# Patient Record
Sex: FEMALE | Race: WHITE | Employment: FULL TIME | URBAN - METROPOLITAN AREA
[De-identification: names, ages, dates, MRNs, and addresses within clinical notes are randomized per-mention and may not be internally consistent; named-entity substitution may affect disease eponyms.]

---

## 2017-10-14 ENCOUNTER — CLINICAL SUPPORT (OUTPATIENT)
Dept: PRIMARY CARE CLINIC | Age: 62
End: 2017-10-14

## 2017-10-14 DIAGNOSIS — Z23 ENCOUNTER FOR IMMUNIZATION: Primary | ICD-10-CM

## 2017-10-14 NOTE — PROGRESS NOTES
Chief Complaint   Patient presents with    Immunization/Injection     needs flu shot     Omar De Oliveira  is a 60 y.o.  female  who present for flu immunizations/injections. She denies any symptoms , reactions or allergies that would exclude them from being immunized today. Injection given in left deltoid. Verbal order given for vaccine by Kacey Hill MD.   Risks and adverse reactions were discussed and the VIS was given if applicable to them. All questions were addressed. She was observed for 5 min post injection. There were no reactions observed.     Karole Gaucher, LPN

## 2017-10-14 NOTE — MR AVS SNAPSHOT
Visit Information Date & Time Provider Department Dept. Phone Encounter #  
 10/14/2017  8:00  [address], [address] [address] [phone] [id number] Upcoming Health Maintenance Date Due Hepatitis C Screening 1955 DTaP/Tdap/Td series (1 - Tdap) 2/26/1975 PAP AKA CERVICAL CYTOLOGY 2/26/1975 FOBT Q 1 YEAR AGE 50-75 2/26/2004 ZOSTER VACCINE AGE 60> 12/26/2013 BREAST CANCER SCRN MAMMOGRAM 7/22/2015 INFLUENZA AGE 9 TO ADULT 8/1/2017 Allergies as of 10/14/2017  Review Complete On: 10/14/2017 By: Radha Gusman LPN Severity Noted Reaction Type Reactions Augmentin [Amoxicillin-pot Clavulanate]  10/10/2015    Other (comments) Joint pain Current Immunizations  Reviewed on 10/14/2017 Name Date Influenza Vaccine (Quad) PF 10/14/2017  8:47 AM, 10/22/2016 Reviewed by Radha Gusman LPN on 55/21/0193 at  8:57 AM  
You Were Diagnosed With   
  
 Codes Comments Encounter for immunization    -  Primary ICD-10-CM: Y91 ICD-9-CM: V03.89 Vitals LMP OB Status Smoking Status 06/19/2009 Postmenopausal Never Smoker Preferred Pharmacy Pharmacy Name Phone [facility] [address], [address] [place] [address] Avenue [phone] Your Updated Medication List  
  
   
This list is accurate as of: 10/14/17  8:58 AM.  Always use your most recent med list.  
  
  
  
  
 albuterol 90 mcg/actuation inhaler Commonly known as:  PROVENTIL HFA, VENTOLIN HFA, PROAIR HFA Take 1 Puff by inhalation every six (6) hours as needed for Wheezing or Shortness of Breath (cough). ALLEGRA 180 mg tablet Generic drug:  fexofenadine Take  by mouth daily. ALLEGRA-D 24 HOUR 180-240 mg per tablet Generic drug:  fexofenadine-pseudoephedrine Take 1 Tab by mouth daily. ALPRAZolam 0.5 mg tablet Commonly known as:  Carletha Puller Take 0.5 mg by mouth nightly as needed. May take twice a day if needed ALVESCO 160 mcg/actuation Hfaa Generic drug:  Ciclesonide Take  by inhalation. buPROPion  mg SR tablet Commonly known as:  WELLBUTRIN SR  
TAKE ONE TABLET BY MOUTH TWICE DAILY CALCIUM + D PO Take 2 Tabs by mouth daily. citalopram 40 mg tablet Commonly known as:  CELEXA  
TAKE ONE TABLET BY MOUTH EVERY DAY  
  
 diclofenac EC 75 mg EC tablet Commonly known as:  VOLTAREN Take 1 Tab by mouth two (2) times a day. docusate sodium 50 mg capsule Commonly known as:  Vivian Lowers Take 100 mg by mouth daily. famotidine 20 mg tablet Commonly known as:  PEPCID Take 20 mg by mouth daily. guaiFENesin-codeine 100-10 mg/5 mL solution Commonly known as:  ROBITUSSIN AC Take 5 mL by mouth three (3) times daily as needed for Cough or Congestion. Max Daily Amount: 15 mL.  
  
 hydroCHLOROthiazide 25 mg tablet Commonly known as:  HYDRODIURIL Take 1 Tab by mouth daily. montelukast 10 mg tablet Commonly known as:  SINGULAIR Take 1 Tab by mouth daily. NASONEX 50 mcg/actuation nasal spray Generic drug:  mometasone 2 Sprays daily. simvastatin 40 mg tablet Commonly known as:  ZOCOR  
TAKE ONE TABLET BY MOUTH EVERY DAY AT BEDTIME  
  
 SYMBICORT 160-4.5 mcg/actuation Hfaa Generic drug:  budesonide-formoterol Take 2 Puffs by inhalation two (2) times a day. VERAMYST 27.5 mcg/actuation nasal spray Generic drug:  fluticasone 2 Sprays by Nasal route daily. We Performed the Following INFLUENZA VIRUS VAC QUAD,SPLIT,PRESV FREE SYRINGE IM [id number] CPT(R)] [facility]! Dear Ling Sapp: Thank you for requesting a [employer] account. Our records indicate that you already have an active [employer] account. You can access your account anytime at [email or web address] Did you know that you can access your hospital and ER discharge instructions at any time in [employer]?   You can also review all of your test results from your hospital stay or ER visit. Additional Information If you have questions, please visit the Frequently Asked Questions section of the [employer] website at [email or web address] Remember, [employer] is NOT to be used for urgent needs. For medical emergencies, dial 911. Now available from your iPhone and Android! Please provide this summary of care documentation to your next provider. Your primary care clinician is listed as [facility]. If you have any questions after today's visit, please call [phone].

## 2017-11-10 ENCOUNTER — OFFICE VISIT (OUTPATIENT)
Dept: PRIMARY CARE CLINIC | Age: 62
End: 2017-11-10

## 2017-11-10 VITALS
TEMPERATURE: 97.4 F | SYSTOLIC BLOOD PRESSURE: 128 MMHG | BODY MASS INDEX: 24.75 KG/M2 | OXYGEN SATURATION: 97 % | WEIGHT: 154 LBS | RESPIRATION RATE: 18 BRPM | DIASTOLIC BLOOD PRESSURE: 85 MMHG | HEIGHT: 66 IN | HEART RATE: 97 BPM

## 2017-11-10 DIAGNOSIS — J45.21 MILD INTERMITTENT ASTHMATIC BRONCHITIS WITH ACUTE EXACERBATION: ICD-10-CM

## 2017-11-10 DIAGNOSIS — S05.01XA ABRASION OF RIGHT CORNEA, INITIAL ENCOUNTER: Primary | ICD-10-CM

## 2017-11-10 DIAGNOSIS — R06.02 SHORTNESS OF BREATH: ICD-10-CM

## 2017-11-10 RX ORDER — GENTAMICIN SULFATE 0.3 %
0.5 OINTMENT (GRAM) OPHTHALMIC (EYE) 3 TIMES DAILY
Qty: 3.5 G | Refills: 0 | Status: SHIPPED | OUTPATIENT
Start: 2017-11-10 | End: 2017-11-10 | Stop reason: ALTCHOICE

## 2017-11-10 RX ORDER — BUPROPION HYDROCHLORIDE 300 MG/1
300 TABLET ORAL DAILY
COMMUNITY
Start: 2017-11-09

## 2017-11-10 RX ORDER — ERYTHROMYCIN 5 MG/G
OINTMENT OPHTHALMIC
Qty: 3.5 G | Refills: 0 | Status: SHIPPED | OUTPATIENT
Start: 2017-11-10 | End: 2018-10-20 | Stop reason: ALTCHOICE

## 2017-11-10 NOTE — PATIENT INSTRUCTIONS
Asthma Attack: Care Instructions  Your Care Instructions    During an asthma attack, the airways swell and narrow. This makes it hard to breathe. Severe asthma attacks can be life-threatening, but you can help prevent them by keeping your asthma under control and treating symptoms before they get bad. Symptoms include being short of breath, having chest tightness, coughing, and wheezing. Noting and treating these symptoms can also help you avoid future trips to the emergency room. The doctor has checked you carefully, but problems can develop later. If you notice any problems or new symptoms, get medical treatment right away. Follow-up care is a key part of your treatment and safety. Be sure to make and go to all appointments, and call your doctor if you are having problems. It's also a good idea to know your test results and keep a list of the medicines you take. How can you care for yourself at home? · Follow your asthma action plan to prevent and treat attacks. If you don't have an asthma action plan, work with your doctor to create one. · Take your asthma medicines exactly as prescribed. Talk to your doctor right away if you have any questions about how to take them. ¨ Use your quick-relief medicine when you have symptoms of an attack. Quick-relief medicine is usually an albuterol inhaler. Some people need to use quick-relief medicine before they exercise. ¨ Take your controller medicine every day, not just when you have symptoms. Controller medicine is usually an inhaled corticosteroid. The goal is to prevent problems before they occur. Don't use your controller medicine to treat an attack that has already started. It doesn't work fast enough to help. ¨ If your doctor prescribed corticosteroid pills to use during an attack, take them exactly as prescribed. It may take hours for the pills to work, but they may make the episode shorter and help you breathe better.   ¨ Keep your quick-relief medicine with you at all times. · Talk to your doctor before using other medicines. Some medicines, such as aspirin, can cause asthma attacks in some people. · If you have a peak flow meter, use it to check how well you are breathing. This can help you predict when an asthma attack is going to occur. Then you can take medicine to prevent the asthma attack or make it less severe. · Do not smoke or allow others to smoke around you. Avoid smoky places. Smoking makes asthma worse. If you need help quitting, talk to your doctor about stop-smoking programs and medicines. These can increase your chances of quitting for good. · Learn what triggers an asthma attack for you, and avoid the triggers when you can. Common triggers include colds, smoke, air pollution, dust, pollen, mold, pets, cockroaches, stress, and cold air. · Avoid colds and the flu. Get a pneumococcal vaccine shot. If you have had one before, ask your doctor if you need a second dose. Get a flu vaccine every fall. If you must be around people with colds or the flu, wash your hands often. When should you call for help? Call 911 anytime you think you may need emergency care. For example, call if:  ? · You have severe trouble breathing. ?Call your doctor now or seek immediate medical care if:  ? · Your symptoms do not get better after you have followed your asthma action plan. ? · You have new or worse trouble breathing. ? · Your coughing and wheezing get worse. ? · You cough up dark brown or bloody mucus (sputum). ? · You have a new or higher fever. ? Watch closely for changes in your health, and be sure to contact your doctor if:  ? · You need to use quick-relief medicine on more than 2 days a week (unless it is just for exercise). ? · You cough more deeply or more often, especially if you notice more mucus or a change in the color of your mucus. ? · You are not getting better as expected. Where can you learn more?   Go to http://juels-stephanie.info/. Enter U974 in the search box to learn more about \"Asthma Attack: Care Instructions. \"  Current as of: May 12, 2017  Content Version: 11.4  © 7189-7273 [employer]. Care instructions adapted under license by [employer] (which disclaims liability or warranty for this information). If you have questions about a medical condition or this instruction, always ask your healthcare professional. [place] [address] any warranty or liability for your use of this information. Corneal Scratches: Care Instructions  Your Care Instructions    The cornea is the clear surface that covers the front of the eye. When a speck of dirt, a wood chip, an insect, or another object flies into your eye, it can cause a painful scratch on the cornea. Wearing contact lenses too long or rubbing your eyes can also scratch the cornea. Small scratches usually heal in a day or two. Deeper scratches may take longer. If you have had a foreign object removed from your eye or you have a corneal scratch, you will need to watch for infection and vision problems while your eye heals. Follow-up care is a key part of your treatment and safety. Be sure to make and go to all appointments, and call your doctor if you are having problems. It's also a good idea to know your test results and keep a list of the medicines you take. How can you care for yourself at home? · The doctor probably used a medicine during your exam to numb your eye. When it wears off in 30 to 60 minutes, your eye pain may come back. Take pain medicines exactly as directed. ¨ If the doctor gave you a prescription medicine for pain, take it as prescribed. ¨ If you are not taking a prescription pain medicine, ask your doctor if you can take an over-the-counter medicine. ¨ Do not take two or more pain medicines at the same time unless the doctor told you to.  Many pain medicines have acetaminophen, which is Tylenol. Too much acetaminophen (Tylenol) can be harmful. · Do not rub your injured eye. Rubbing can make it worse. · Use the prescribed eyedrops or ointment as directed. Be sure the dropper or bottle tip is clean. To put in eyedrops or ointment:  ¨ Tilt your head back, and pull your lower eyelid down with one finger. ¨ Drop or squirt the medicine inside the lower lid. ¨ Close your eye for 30 to 60 seconds to let the drops or ointment move around. ¨ Do not touch the ointment or dropper tip to your eyelashes or any other surface. · Do not use your contact lens in your hurt eye until your doctor says you can. Also, do not wear eye makeup until your eye has healed. · Do not drive if you have blurred vision. · Bright light may hurt. Sunglasses can help. · To prevent eye injuries in the future, wear safety glasses or goggles when you work with machines or tools, mow the lawn, or ride a bike or motorcycle. When should you call for help? Call your doctor now or seek immediate medical care if:  ? · You have signs of an eye infection, such as:  ¨ Pus or thick discharge coming from the eye. ¨ Redness or swelling around the eye. ¨ A fever. ? · You have new or worse eye pain. ? · You have vision changes. ? · It feels like there is something in your eye. ? · Light hurts your eye. ? Watch closely for changes in your health, and be sure to contact your doctor if:  ? · You do not get better as expected. Where can you learn more? Go to http://jules-stephanie.info/. Enter J420 in the search box to learn more about \"Corneal Scratches: Care Instructions. \"  Current as of: March 3, 2017  Content Version: 11.4  © 8477-6216 [employer]. Care instructions adapted under license by [employer] (which disclaims liability or warranty for this information).  If you have questions about a medical condition or this instruction, always ask your healthcare professional. [employer] disclaims any warranty or liability for your use of this information.

## 2017-11-10 NOTE — PROGRESS NOTES
Chief Complaint   Patient presents with    Asthma     for a couple weeks having trouble breathing, has used inhaler

## 2017-11-10 NOTE — PROGRESS NOTES
Shilo Vance is a 60 y.o. female who presents for coughing and chest tightness x2 weeks. She has hx of asthma diagnosed 5 yrs ago. She does not use symbicort, but has been using albuterol prn, last used this AM. States she feels tight to breathe when she goes up the stairs. She takes allegra D. No fever or chills. No recent asthma flare in the past year. She also complains of right eye pain. States she scratched her eye 2 days ago with a mascara wand. Eye is painful, she denies any thick drainage but eyes are watery. She has tried artifical tears. She has avoided wearing her contact lenses since. 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 60 Tab 0    albuterol (PROVENTIL HFA, VENTOLIN HFA, PROAIR HFA) 90 mcg/actuation inhaler Take 1 Puff by inhalation every six (6) hours as needed for Wheezing or Shortness of Breath (cough). 1 Inhaler 0    montelukast (SINGULAIR) 10 mg tablet Take 1 Tab by mouth daily. 30 Tab 0    fexofenadine-pseudoephedrine (ALLEGRA-D 24 HOUR) 180-240 mg per tablet Take 1 Tab by mouth daily.  famotidine (PEPCID) 20 mg tablet Take 20 mg by mouth daily.  Ciclesonide (ALVESCO) 160 mcg/actuation HFAA Take  by inhalation.       mometasone (NASONEX) 50 mcg/actuation nasal spray 2 Sprays daily.      fluticasone (VERAMYST) 27.5 mcg/actuation nasal spray 2 Sprays by Nasal route daily.  hydrochlorothiazide (HYDRODIURIL) 25 mg tablet Take 1 Tab by mouth daily. 30 Tab 6    citalopram (CELEXA) 40 mg tablet TAKE ONE TABLET BY MOUTH EVERY DAY 90 Tab 2    simvastatin (ZOCOR) 40 mg tablet TAKE ONE TABLET BY MOUTH EVERY DAY AT BEDTIME 90 Tab 2    alprazolam (XANAX) 0.5 mg tablet Take 0.5 mg by mouth nightly as needed. May take twice a day if needed      docusate sodium (COLACE) 50 mg capsule Take 100 mg by mouth daily.  CALCIUM CARBONATE/VITAMIN D3 (CALCIUM + D PO) Take 2 Tabs by mouth daily. Allergies:    Allergies   Allergen Reactions    Augmentin [Amoxicillin-Pot Clavulanate] Other (comments)     Joint pain       Smoker:  History   Smoking Status    Never Smoker   Smokeless Tobacco    Never Used       ETOH:   History   Alcohol Use    Yes     Comment: social       FH:   Family History   Problem Relation Age of Onset    Stroke Mother     Hypertension Mother     Asthma Maternal Aunt     Cancer Maternal Uncle     Diabetes Paternal Uncle     Heart Disease Maternal Grandfather        ROS:  General/Constitutional:   No headache, fever, fatigue, weight loss or weight gain       Eyes:   No redness, pruritis, pain, visual changes, swelling, or discharge      Ears:    No pain, loss or changes in hearing     Nose: Nasal congestion and rhinorrea  Neck:   No swelling, masses, stiffness, pain, or limited movement     Cardiac:    No chest pain      Respiratory:  cough   GI:   No nausea/vomiting, diarrhea, abdominal pain, bloody or dark stools    : no dysuria      Skin: No rash   Neuro: no numbness or weakness  Psych: no depression    Physical Exam:  Visit Vitals    /85 (BP 1 Location: Right arm, BP Patient Position: Sitting)    Pulse 97    Temp 97.4 °F (36.3 °C) (Oral)    Resp 18    Ht 5' 6\" (1.676 m)    Wt 154 lb (69.9 kg)    LMP 06/19/2009    SpO2 97%    BMI 24.86 kg/m2 General: Alert and oriented, in no acute distress. Responds to all questions appropriately. SKIN: No rash. Normal color. HEAD: No sinus tenderness. EYES: right eye mildly erythematous, clear drainage. Left eye normal.   EARS: External normal, canals clear, tympanic membranes normal.  NOSE: Edema, erythema, clear mucous drainage. OROPHARYNX: Slight tonsil edema, erythema, no exudate. NECK: Supple; no masses; normal lymphadenopathy. LUNGS: Respirations unlabored; clear to auscultation bilaterally, no wheeze, rales or rhonchi. CARDIOVASCULAR: Regular, rate, and rhythm without murmurs, gallops or rubs. ABDOMEN: soft, NT  EXTREMITIES: No edema, cyanosis or clubbing. NEUROLOGIC: Speech intact; face symmetrical; moves all extremities equally  PSYCH: normal mood and behavior      Assessment:    ICD-10-CM ICD-9-CM    1. Abrasion of right cornea, initial encounter S05. 01XA 918.1 erythromycin (ILOTYCIN) ophthalmic ointment      DISCONTINUED: gentamicin (GARAMYCIN) 0.3 % (3 mg/gram) ophthalmic ointment   2. Shortness of breath R06.02 786.05 buPROPion XL (WELLBUTRIN XL) 300 mg XL tablet      XR CHEST PA LAT   3. Mild intermittent asthmatic bronchitis with acute exacerbation J45.21 493.92 beclomethasone (QVAR) 40 mcg/actuation aero     Right eye examined under UV light after applying benzocaine drops and fluorescein dye, revealed 2-3 mm vertical corneal defect. Start erythromycin oint, tylenol prn for pain, avoid contacts until pain free. ED warnings discussed. SOB/asthma exacerbation, CXR clear, no hypoxia. No wheezing now. Patient worried about chest tightness. Declined course of prednisone. She does not use controller inhaler and rx has . Try short course of inhaled qvar, return if no improvement or worsening. Continue albuterol prn. Plan:  Discharge instructions:  1. Combination cough and cold medicine such as Mucinex DM  2. Salt water gargle. 3. Plenty of fluids.   4. Ibuprofen (Motrin, Advil):  200mg - take 1-4 tables three times as needed for pain and fever   5. Acetaminophen (Tylenol):  500mg 1-2 tablets every 6 hours as needed for pain and fever. 6. Throat lozenges such as Halls as needed. 7. Humidifier as needed. Follow Up:  Get re-examined if not improved in  5-7 days or if symptoms worsen. If you get suddenly worse, go to the nearest hospital Emergency Room    This note will not be viewable in MyChart.

## 2017-11-10 NOTE — MR AVS SNAPSHOT
Visit Information Date & Time Provider Department Dept. Phone Encounter #  
 11/10/2017  4:30 PM Dean Cindi MachadoJaylan [id number] Upcoming Health Maintenance Date Due Hepatitis C Screening 1955 DTaP/Tdap/Td series (1 - Tdap) 2/26/1975 PAP AKA CERVICAL CYTOLOGY 2/26/1975 FOBT Q 1 YEAR AGE 50-75 2/26/2004 ZOSTER VACCINE AGE 60> 12/26/2013 BREAST CANCER SCRN MAMMOGRAM 7/22/2015 Allergies as of 11/10/2017  Review Complete On: 11/10/2017 By: Dean Machado MD  
  
 Severity Noted Reaction Type Reactions Augmentin [Amoxicillin-pot Clavulanate]  10/10/2015    Other (comments) Joint pain Current Immunizations  Reviewed on 10/14/2017 Name Date Influenza Vaccine (Quad) PF 10/14/2017  8:47 AM, 10/22/2016 Not reviewed this visit You Were Diagnosed With   
  
 Codes Comments Abrasion of right cornea, initial encounter    -  Primary ICD-10-CM: S05. [place] Self ICD-9-CM: 918.1 Shortness of breath     ICD-10-CM: R06.02 
ICD-9-CM: 786.05   
 Mild intermittent asthmatic bronchitis with acute exacerbation     ICD-10-CM: J45.21 ICD-9-CM: 422.82 Vitals BP Pulse Temp Resp Height(growth percentile) Weight(growth percentile) 128/85 (BP 1 Location: Right arm, BP Patient Position: Sitting) 97 97.4 °F (36.3 °C) (Oral) 18 5' 6\" (1.676 m) 154 lb (69.9 kg) LMP SpO2 BMI OB Status Smoking Status 06/19/2009 97% 24.86 kg/m2 Postmenopausal Never Smoker BMI and BSA Data Body Mass Index Body Surface Area  
 24.86 kg/m 2 1.8 m 2 Preferred Pharmacy Pharmacy Name [facility], [address] [phone] Your Updated Medication List  
  
   
This list is accurate as of: 11/10/17  5:27 PM.  Always use your most recent med list.  
  
  
  
  
 albuterol 90 mcg/actuation inhaler Commonly known as:  PROVENTIL HFA, VENTOLIN HFA, PROAIR HFA  
 Take 1 Puff by inhalation every six (6) hours as needed for Wheezing or Shortness of Breath (cough). ALLEGRA-D 24 HOUR 180-240 mg per tablet Generic drug:  fexofenadine-pseudoephedrine Take 1 Tab by mouth daily. ALPRAZolam 0.5 mg tablet Commonly known as:  Kimberly Cameron Take 0.5 mg by mouth nightly as needed. May take twice a day if needed ALVESCO 160 mcg/actuation Hfaa Generic drug:  Ciclesonide Take  by inhalation. beclomethasone 40 mcg/actuation MediaSilo Commonly known as:  QVAR Take 1-2 Puffs by inhalation two (2) times a day. buPROPion  mg XL tablet Commonly known as:  WELLBUTRIN XL  
  
 CALCIUM + D PO Take 2 Tabs by mouth daily. citalopram 40 mg tablet Commonly known as:  CELEXA  
TAKE ONE TABLET BY MOUTH EVERY DAY  
  
 diclofenac EC 75 mg EC tablet Commonly known as:  VOLTAREN Take 1 Tab by mouth two (2) times a day. docusate sodium 50 mg capsule Commonly known as:  Jb Booze Take 100 mg by mouth daily. famotidine 20 mg tablet Commonly known as:  PEPCID Take 20 mg by mouth daily. gentamicin 0.3 % (3 mg/gram) ophthalmic ointment Commonly known as:  GARAMYCIN Administer 0.5 Inches to right eye three (3) times daily for 5 days. hydroCHLOROthiazide 25 mg tablet Commonly known as:  HYDRODIURIL Take 1 Tab by mouth daily. montelukast 10 mg tablet Commonly known as:  SINGULAIR Take 1 Tab by mouth daily. NASONEX 50 mcg/actuation nasal spray Generic drug:  mometasone 2 Sprays daily. simvastatin 40 mg tablet Commonly known as:  ZOCOR  
TAKE ONE TABLET BY MOUTH EVERY DAY AT BEDTIME  
  
 VERAMYST 27.5 mcg/actuation nasal spray Generic drug:  fluticasone 2 Sprays by Nasal route daily. Prescriptions Sent to Pharmacy Refills  
 gentamicin (GARAMYCIN) 0.3 % (3 mg/gram) ophthalmic ointment 0 Sig: Administer 0.5 Inches to right eye three (3) times daily for 5 days. Class: Normal  
 [facility] #: [phone] Route: Right Eye  
 beclomethasone (QVAR) 40 mcg/actuation aero 0 Sig: Take 1-2 Puffs by inhalation two (2) times a day. Class: Normal  
 Pharmacy: [address] [place], [place] [address]: [phone] Route: Inhalation To-Do List   
 11/10/2017 Imaging:  XR CHEST PA LAT Patient Instructions Asthma Attack: Care Instructions Your Care Instructions During an asthma attack, the airways swell and narrow. This makes it hard to breathe. Severe asthma attacks can be life-threatening, but you can help prevent them by keeping your asthma under control and treating symptoms before they get bad. Symptoms include being short of breath, having chest tightness, coughing, and wheezing. Noting and treating these symptoms can also help you avoid future trips to the emergency room. The doctor has checked you carefully, but problems can develop later. If you notice any problems or new symptoms, get medical treatment right away. Follow-up care is a key part of your treatment and safety. Be sure to make and go to all appointments, and call your doctor if you are having problems. It's also a good idea to know your test results and keep a list of the medicines you take. How can you care for yourself at home? · Follow your asthma action plan to prevent and treat attacks. If you don't have an asthma action plan, work with your doctor to create one. · Take your asthma medicines exactly as prescribed. Talk to your doctor right away if you have any questions about how to take them. ¨ Use your quick-relief medicine when you have symptoms of an attack. Quick-relief medicine is usually an albuterol inhaler. Some people need to use quick-relief medicine before they exercise.  
¨ Take your controller medicine every day, not just when you have symptoms. Controller medicine is usually an inhaled corticosteroid. The goal is to prevent problems before they occur. Don't use your controller medicine to treat an attack that has already started. It doesn't work fast enough to help. ¨ If your doctor prescribed corticosteroid pills to use during an attack, take them exactly as prescribed. It may take hours for the pills to work, but they may make the episode shorter and help you breathe better. ¨ Keep your quick-relief medicine with you at all times. · Talk to your doctor before using other medicines. Some medicines, such as aspirin, can cause asthma attacks in some people. · If you have a peak flow meter, use it to check how well you are breathing. This can help you predict when an asthma attack is going to occur. Then you can take medicine to prevent the asthma attack or make it less severe. · Do not smoke or allow others to smoke around you. Avoid smoky places. Smoking makes asthma worse. If you need help quitting, talk to your doctor about stop-smoking programs and medicines. These can increase your chances of quitting for good. · Learn what triggers an asthma attack for you, and avoid the triggers when you can. Common triggers include colds, smoke, air pollution, dust, pollen, mold, pets, cockroaches, stress, and cold air. · Avoid colds and the flu. Get a pneumococcal vaccine shot. If you have had one before, ask your doctor if you need a second dose. Get a flu vaccine every fall. If you must be around people with colds or the flu, wash your hands often. When should you call for help? Call 911 anytime you think you may need emergency care. For example, call if: 
? · You have severe trouble breathing. ?Call your doctor now or seek immediate medical care if: 
? · Your symptoms do not get better after you have followed your asthma action plan. ? · You have new or worse trouble breathing. ? · Your coughing and wheezing get worse. ? · You cough up dark brown or bloody mucus (sputum). ? · You have a new or higher fever. ? Watch closely for changes in your health, and be sure to contact your doctor if: 
? · You need to use quick-relief medicine on more than 2 days a week (unless it is just for exercise). ? · You cough more deeply or more often, especially if you notice more mucus or a change in the color of your mucus. ? · You are not getting better as expected. Where can you learn more? Go to http://jules-stephanie.info/. Enter R705 in the search box to learn more about \"Asthma Attack: Care Instructions. \" Current as of: May 12, 2017 Content Version: 11.4 © 7002-3010 Sonos. Care instructions adapted under license by [employer] (which disclaims liability or warranty for this information). If you have questions about a medical condition or this instruction, always ask your healthcare professional. [place] [address] any warranty or liability for your use of this information. Corneal Scratches: Care Instructions Your Care Instructions The cornea is the clear surface that covers the front of the eye. When a speck of dirt, a wood chip, an insect, or another object flies into your eye, it can cause a painful scratch on the cornea. Wearing contact lenses too long or rubbing your eyes can also scratch the cornea. Small scratches usually heal in a day or two. Deeper scratches may take longer. If you have had a foreign object removed from your eye or you have a corneal scratch, you will need to watch for infection and vision problems while your eye heals. Follow-up care is a key part of your treatment and safety. Be sure to make and go to all appointments, and call your doctor if you are having problems. It's also a good idea to know your test results and keep a list of the medicines you take. How can you care for yourself at home? · The doctor probably used a medicine during your exam to numb your eye. When it wears off in 30 to 60 minutes, your eye pain may come back. Take pain medicines exactly as directed. ¨ If the doctor gave you a prescription medicine for pain, take it as prescribed. ¨ If you are not taking a prescription pain medicine, ask your doctor if you can take an over-the-counter medicine. ¨ Do not take two or more pain medicines at the same time unless the doctor told you to. Many pain medicines have acetaminophen, which is Tylenol. Too much acetaminophen (Tylenol) can be harmful. · Do not rub your injured eye. Rubbing can make it worse. · Use the prescribed eyedrops or ointment as directed. Be sure the dropper or bottle tip is clean. To put in eyedrops or ointment: ¨ Tilt your head back, and pull your lower eyelid down with one finger. ¨ Drop or squirt the medicine inside the lower lid. ¨ Close your eye for 30 to 60 seconds to let the drops or ointment move around. ¨ Do not touch the ointment or dropper tip to your eyelashes or any other surface. · Do not use your contact lens in your hurt eye until your doctor says you can. Also, do not wear eye makeup until your eye has healed. · Do not drive if you have blurred vision. · Bright light may hurt. Sunglasses can help. · To prevent eye injuries in the future, wear safety glasses or goggles when you work with machines or tools, mow the lawn, or ride a bike or motorcycle. When should you call for help? Call your doctor now or seek immediate medical care if: 
? · You have signs of an eye infection, such as: 
¨ Pus or thick discharge coming from the eye. ¨ Redness or swelling around the eye. ¨ A fever. ? · You have new or worse eye pain. ? · You have vision changes. ? · It feels like there is something in your eye. ? · Light hurts your eye. ? Watch closely for changes in your health, and be sure to contact your doctor if: ? · You do not get better as expected. Where can you learn more? Go to http://jules-stephanie.info/. Enter U347 in the search box to learn more about \"Corneal Scratches: Care Instructions. \" Current as of: March 3, 2017 Content Version: 11.4 © 0613-2778 Apse. Care instructions adapted under license by [employer] (which disclaims liability or warranty for this information). If you have questions about a medical condition or this instruction, always ask your healthcare professional. [address] any warranty or liability for your use of this information. Introducing hospitals & HEALTH SERVICES! Dear Nancy Bhatt: Thank you for requesting a Ecwid account. Our records indicate that you already have an active Ecwid account. You can access your account anytime at [email or web address] Did you know that you can access your hospital and ER discharge instructions at any time in Ecwid? You can also review all of your test results from your hospital stay or ER visit. Additional Information If you have questions, please visit the Frequently Asked Questions section of the Ecwid website at [email or web address] Remember, Ecwid is NOT to be used for urgent needs. For medical emergencies, dial 911. Now available from your iPhone and Android! Please provide this summary of care documentation to your next provider. Your primary care clinician is listed as [facility]. If you have any questions after today's visit, please call [phone].

## 2018-10-20 ENCOUNTER — OFFICE VISIT (OUTPATIENT)
Dept: PRIMARY CARE CLINIC | Age: 63
End: 2018-10-20

## 2018-10-20 VITALS
OXYGEN SATURATION: 98 % | RESPIRATION RATE: 17 BRPM | HEIGHT: 66 IN | HEART RATE: 71 BPM | SYSTOLIC BLOOD PRESSURE: 122 MMHG | DIASTOLIC BLOOD PRESSURE: 74 MMHG | WEIGHT: 153.2 LBS | TEMPERATURE: 98.7 F | BODY MASS INDEX: 24.62 KG/M2

## 2018-10-20 DIAGNOSIS — J40 BRONCHITIS: ICD-10-CM

## 2018-10-20 DIAGNOSIS — J01.00 ACUTE MAXILLARY SINUSITIS, RECURRENCE NOT SPECIFIED: Primary | ICD-10-CM

## 2018-10-20 DIAGNOSIS — J45.20 MILD INTERMITTENT ASTHMA, UNSPECIFIED WHETHER COMPLICATED: ICD-10-CM

## 2018-10-20 RX ORDER — CODEINE PHOSPHATE AND GUAIFENESIN 10; 100 MG/5ML; MG/5ML
5 SOLUTION ORAL
Qty: 100 ML | Refills: 0 | Status: SHIPPED | OUTPATIENT
Start: 2018-10-20 | End: 2018-12-05 | Stop reason: ALTCHOICE

## 2018-10-20 RX ORDER — PREDNISONE 20 MG/1
TABLET ORAL
Qty: 10 TAB | Refills: 0 | Status: SHIPPED | OUTPATIENT
Start: 2018-10-20 | End: 2018-12-05 | Stop reason: ALTCHOICE

## 2018-10-20 RX ORDER — CEFDINIR 300 MG/1
300 CAPSULE ORAL 2 TIMES DAILY
Qty: 20 CAP | Refills: 0 | Status: SHIPPED | OUTPATIENT
Start: 2018-10-20 | End: 2018-10-30

## 2018-10-20 NOTE — PATIENT INSTRUCTIONS
Sinusitis: Care Instructions Your Care Instructions Sinusitis is an infection of the lining of the sinus cavities in your head. Sinusitis often follows a cold. It causes pain and pressure in your head and face. In most cases, sinusitis gets better on its own in 1 to 2 weeks. But some mild symptoms may last for several weeks. Sometimes antibiotics are needed. Follow-up care is a key part of your treatment and safety. Be sure to make and go to all appointments, and call your doctor if you are having problems. It's also a good idea to know your test results and keep a list of the medicines you take. How can you care for yourself at home? · Take an over-the-counter pain medicine, such as acetaminophen (Tylenol), ibuprofen (Advil, Motrin), or naproxen (Aleve). Read and follow all instructions on the label. · If the doctor prescribed antibiotics, take them as directed. Do not stop taking them just because you feel better. You need to take the full course of antibiotics. · Be careful when taking over-the-counter cold or flu medicines and Tylenol at the same time. Many of these medicines have acetaminophen, which is Tylenol. Read the labels to make sure that you are not taking more than the recommended dose. Too much acetaminophen (Tylenol) can be harmful. · Breathe warm, moist air from a steamy shower, a hot bath, or a sink filled with hot water. Avoid cold, dry air. Using a humidifier in your home may help. Follow the directions for cleaning the machine. · Use saline (saltwater) nasal washes to help keep your nasal passages open and wash out mucus and bacteria. You can buy saline nose drops at a grocery store or drugstore. Or you can make your own at home by adding 1 teaspoon of salt and 1 teaspoon of baking soda to 2 cups of distilled water. If you make your own, fill a bulb syringe with the solution, insert the tip into your nostril, and squeeze gently. Hillary Preciado your nose. · Put a hot, wet towel or a warm gel pack on your face 3 or 4 times a day for 5 to 10 minutes each time. · Try a decongestant nasal spray like oxymetazoline (Afrin). Do not use it for more than 3 days in a row. Using it for more than 3 days can make your congestion worse. When should you call for help? Call your doctor now or seek immediate medical care if: 
  · You have new or worse swelling or redness in your face or around your eyes.  
  · You have a new or higher fever.  
 Watch closely for changes in your health, and be sure to contact your doctor if: 
  · You have new or worse facial pain.  
  · The mucus from your nose becomes thicker (like pus) or has new blood in it.  
  · You are not getting better as expected. Where can you learn more? Go to http://jules-stephanie.info/. Enter R513 in the search box to learn more about \"Sinusitis: Care Instructions. \" Current as of: March 28, 2018 Content Version: 11.8 © 3065-6317 [employer]. Care instructions adapted under license by [employer] (which disclaims liability or warranty for this information). If you have questions about a medical condition or this instruction, always ask your healthcare professional. [address] any warranty or liability for your use of this information. Saline Nasal Washes: Care Instructions Your Care Instructions Saline nasal washes help keep the nasal passages open by washing out thick or dried mucus. This simple remedy can help relieve symptoms of allergies, sinusitis, and colds. It also can make the nose feel more comfortable by keeping the mucous membranes moist. You may notice a little burning sensation in your nose the first few times you use the solution, but this usually gets better in a few days. Follow-up care is a key part of your treatment and safety.  Be sure to make and go to all appointments, and call your doctor if you are having problems. It's also a good idea to know your test results and keep a list of the medicines you take. How can you care for yourself at home? · You can buy premixed saline solution in a squeeze bottle or other sinus rinse products at a drugstore. Read and follow the instructions on the label. · You also can make your own saline solution by adding 1 teaspoon of salt and 1 teaspoon of baking soda to 2 cups of distilled water. · If you use a homemade solution, pour a small amount into a clean bowl. Using a rubber bulb syringe, squeeze the syringe and place the tip in the salt water. Pull a small amount of the salt water into the syringe by relaxing your hand. · Sit down with your head tilted slightly back. Do not lie down. Put the tip of the bulb syringe or the squeeze bottle a little way into one of your nostrils. Gently drip or squirt a few drops into the nostril. Repeat with the other nostril. Some sneezing and gagging are normal at first. 
· Gently blow your nose. · Wipe the syringe or bottle tip clean after each use. · Repeat this 2 or 3 times a day. · Use nasal washes gently if you have nosebleeds often. When should you call for help? Watch closely for changes in your health, and be sure to contact your doctor if: 
  · You often get nosebleeds.  
  · You have problems doing the nasal washes. Where can you learn more? Go to http://jules-stephanie.info/. Enter [id number] [id number] [phone] in the search box to learn more about \"Saline Nasal Washes: Care Instructions. \" Current as of: March 28, 2018 Content Version: 11.8 © 0517-0567 Medrio. Care instructions adapted under license by Datezr (which disclaims liability or warranty for this information). If you have questions about a medical condition or this instruction, always ask your healthcare professional. [address] any warranty or liability for your use of this information.

## 2018-10-20 NOTE — PROGRESS NOTES
Subjective:  
Sarah Hankins is a 58 y.o. female who presents for evaluation of possible sinus infection. Started as a cold last week and worsened this week. Complains of sore throat, bilateral ear pressure/clogged, sinus pressure, yellow nasal discharge, and cough. She has a history of multiple sinus infections and bronchitis. Denies any fevers or SOB. She has had mild shortness of breath and wheezing. Not using her albuterol inh that she has on hand. Has if she needs it. \"Not that bad\" and has had asthma for many yrs. Knows when to use it. Taking ibuprofen and allegra D.  Zinc. 
 
Evaluation to date: none. Treatment to date: antihistamines, OTC products. Patient does not smoke cigarettes. Relevant PMH:  
Past Medical History:  
Diagnosis Date  Allergic rhinitis 8/19/2013  Anxiety 8/19/2013  Hyperlipidemia 8/19/2013  Other ill-defined conditions(799.89)   
 hx heart murmur  Psychiatric disorder   
 depression  Unspecified adverse effect of anesthesia   
 hard to wake up Past Surgical History:  
Procedure Laterality Date [address]  HX GI    
 HX HEENT    
 bilateral eyelid surgery  HX OTHER SURGICAL    
 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 Objective:  
 
Visit Vitals /74 (BP 1 Location: Left arm, BP Patient Position: Sitting) Pulse 71 Temp 98.7 °F (37.1 °C) (Oral) Resp 17 Ht 5' 6\" (1.676 m) Wt 153 lb 3.2 oz (69.5 kg) LMP 06/19/2009 SpO2 98% BMI 24.73 kg/m² General:  alert, cooperative, no distress Eyes: negative Ears: TM's full bilaterally, no erythema Sinuses: tenderness over bilateral maxillary Mouth:  Lips, mucosa, and tongue normal. Teeth and gums normal and abnormal findings: mild oropharyngeal erythema Neck: supple, symmetrical, trachea midline and no adenopathy. Heart: S1 and S2 normal, no murmurs noted. Lungs: clear to auscultation bilaterally, congested cough Assessment/Plan: ICD-10-CM ICD-9-CM 1. Acute maxillary sinusitis, recurrence not specified J01.00 461.0 2. Bronchitis J40 490 guaiFENesin-codeine (GUAIATUSSIN AC) 100-10 mg/5 mL solution 3. Mild intermittent asthma, unspecified whether complicated Q51.30 859.32 Orders Placed This Encounter  cefdinir (OMNICEF) 300 mg capsule  predniSONE (DELTASONE) 20 mg tablet  guaiFENesin-codeine (GUAIATUSSIN AC) 100-10 mg/5 mL solution Pt asks about steroid injection, do not believe this is needed at this time. Will offer steroid taper and this was discussed w/ pt at time of visit. Pt has allergy to Augmentin and states doxy/bactrim haven't worked in past.  Pt states Noemi Card never works for her. Antibiotic beginning with \"c\" worked in past.  Will try cefdinir. Discussed the dx and tx of sinusitis. Suggested symptomatic OTC remedies. Nasal saline sprays for congestion. Antibiotics per orders. RTC prn. Laura Russo NP This note will not be viewable in [address]

## 2018-10-20 NOTE — PROGRESS NOTES
Chief Complaint Patient presents with  Cold Symptoms  
pt c/o nasal congestion, cough x , pt states she has taken otc ibuprofen and allegra d  to help with sx relief, denies nausea, vomiting or fever. This note will not be viewable in [address]

## 2018-12-05 ENCOUNTER — OFFICE VISIT (OUTPATIENT)
Dept: PRIMARY CARE CLINIC | Age: 63
End: 2018-12-05

## 2018-12-05 VITALS
HEART RATE: 78 BPM | SYSTOLIC BLOOD PRESSURE: 131 MMHG | TEMPERATURE: 98 F | BODY MASS INDEX: 24.43 KG/M2 | RESPIRATION RATE: 16 BRPM | OXYGEN SATURATION: 95 % | DIASTOLIC BLOOD PRESSURE: 82 MMHG | WEIGHT: 152 LBS | HEIGHT: 66 IN

## 2018-12-05 DIAGNOSIS — J01.10 SUBACUTE FRONTAL SINUSITIS: ICD-10-CM

## 2018-12-05 DIAGNOSIS — R05.9 COUGH: Primary | ICD-10-CM

## 2018-12-05 DIAGNOSIS — R09.89 SINUS COMPLAINT: ICD-10-CM

## 2018-12-05 RX ORDER — DOXYCYCLINE 100 MG/1
100 CAPSULE ORAL 2 TIMES DAILY
Qty: 28 CAP | Refills: 0 | Status: SHIPPED | OUTPATIENT
Start: 2018-12-05 | End: 2018-12-19 | Stop reason: ALTCHOICE

## 2018-12-05 NOTE — PROGRESS NOTES
Chief Complaint   Patient presents with    Pressure Behind the Eyes     Facial pain and chest pain with upper back pain with cough

## 2018-12-05 NOTE — PATIENT INSTRUCTIONS
Cough: Care Instructions  Your Care Instructions    A cough is your body's response to something that bothers your throat or airways. Many things can cause a cough. You might cough because of a cold or the flu, bronchitis, or asthma. Smoking, postnasal drip, allergies, and stomach acid that backs up into your throat also can cause coughs. A cough is a symptom, not a disease. Most coughs stop when the cause, such as a cold, goes away. You can take a few steps at home to cough less and feel better. Follow-up care is a key part of your treatment and safety. Be sure to make and go to all appointments, and call your doctor if you are having problems. It's also a good idea to know your test results and keep a list of the medicines you take. How can you care for yourself at home? · Drink lots of water and other fluids. This helps thin the mucus and soothes a dry or sore throat. Honey or lemon juice in hot water or tea may ease a dry cough. · Take cough medicine as directed by your doctor. · Prop up your head on pillows to help you breathe and ease a dry cough. · Try cough drops to soothe a dry or sore throat. Cough drops don't stop a cough. Medicine-flavored cough drops are no better than candy-flavored drops or hard candy. · Do not smoke. Avoid secondhand smoke. If you need help quitting, talk to your doctor about stop-smoking programs and medicines. These can increase your chances of quitting for good. When should you call for help? Call 911 anytime you think you may need emergency care.  For example, call if:    · You have severe trouble breathing.    Call your doctor now or seek immediate medical care if:    · You cough up blood.     · You have new or worse trouble breathing.     · You have a new or higher fever.     · You have a new rash.    Watch closely for changes in your health, and be sure to contact your doctor if:    · You cough more deeply or more often, especially if you notice more mucus or a change in the color of your mucus.     · You have new symptoms, such as a sore throat, an earache, or sinus pain.     · You do not get better as expected. Where can you learn more? Go to http://jules-stephanie.info/. Enter D279 in the search box to learn more about \"Cough: Care Instructions. \"  Current as of: December 6, 2017  Content Version: 11.8  © 1093-3170 [employer]. Care instructions adapted under license by [employer] (which disclaims liability or warranty for this information). If you have questions about a medical condition or this instruction, always ask your healthcare professional. [address] any warranty or liability for your use of this information.

## 2018-12-05 NOTE — PROGRESS NOTES
Subjective:   Kimberly Vivas is a 58 y.o. female who complains of coryza, congestion, sneezing, sore throat, post nasal drip and dry cough for 25 days, unchanged since that time. She denies a history of shortness of breath and wheezing. Evaluation to date: seen previously and prescribed omnicef, tussinex, and prednisone which the patient reports did not help  Treatment to date: OTC products, which have been somewhat effective, not very effective. Patient does not smoke cigarettes. Relevant PMH: No pertinent additional PMH. Patient Active Problem List   Diagnosis Code    Depression F32.9    Anxiety F41.9    Hyperlipidemia E78.5    Allergic rhinitis J30.9    Elevated BP [id number]    GERD (gastroesophageal reflux disease) K21.9    Osteopenia M85.80     Patient Active Problem List    Diagnosis Date Noted    Depression 08/19/2013    Anxiety 08/19/2013    Hyperlipidemia 08/19/2013    Allergic rhinitis 08/19/2013    Elevated BP 08/19/2013    GERD (gastroesophageal reflux disease) 08/19/2013    Osteopenia 08/19/2013     Current Outpatient Medications   Medication Sig Dispense Refill    doxycycline (MONODOX) 100 mg capsule Take 1 Cap by mouth two (2) times a day for 14 days. 28 Cap 0    buPROPion XL (WELLBUTRIN XL) 300 mg XL tablet       albuterol (PROVENTIL HFA, VENTOLIN HFA, PROAIR HFA) 90 mcg/actuation inhaler Take 1 Puff by inhalation every six (6) hours as needed for Wheezing or Shortness of Breath (cough). 1 Inhaler 0    fexofenadine-pseudoephedrine (ALLEGRA-D 24 HOUR) 180-240 mg per tablet Take 1 Tab by mouth daily.  famotidine (PEPCID) 20 mg tablet Take 20 mg by mouth daily.  hydrochlorothiazide (HYDRODIURIL) 25 mg tablet Take 1 Tab by mouth daily.  30 Tab 6    citalopram (CELEXA) 40 mg tablet TAKE ONE TABLET BY MOUTH EVERY DAY 90 Tab 2    simvastatin (ZOCOR) 40 mg tablet TAKE ONE TABLET BY MOUTH EVERY DAY AT BEDTIME 90 Tab 2    alprazolam (XANAX) 0.5 mg tablet Take 0.5 mg by mouth nightly as needed. May take twice a day if needed      CALCIUM CARBONATE/VITAMIN D3 (CALCIUM + D PO) Take 2 Tabs by mouth daily. 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 Objective:     Visit Vitals  /82   Pulse 78   Temp 98 °F (36.7 °C) (Oral)   Resp 16   Ht 5' 6\" (1.676 m)   Wt 152 lb (68.9 kg)   LMP 2009   SpO2 95%   BMI 24.53 kg/m²     General:  alert, cooperative, no distress   Eyes: negative, conjunctivae/corneas clear. PERRL, EOM's intact. Fundi benign   Ears: normal TM's and external ear canals AU   Sinuses: Normal paranasal sinuses without tenderness   Mouth:  Lips, mucosa, and tongue normal. Teeth and gums normal   Neck: supple, symmetrical, trachea midline and no adenopathy. Heart: S1 and S2 normal, no murmurs noted. Lungs: clear to auscultation bilaterally   Abdomen: soft, non-tender.  Bowel sounds normal. No masses,  no organomegaly Assessment/Plan:   viral upper respiratory illness and sinusitis  Suggested symptomatic OTC remedies. RTC prn. Discussed diagnosis and treatment of viral URIs. Discussed the importance of avoiding unnecessary antibiotic therapy. ICD-10-CM ICD-9-CM    1. Cough R05 786.2 XR CHEST PA LAT      doxycycline (MONODOX) 100 mg capsule   2. Subacute frontal sinusitis J01.10 461.1 doxycycline (MONODOX) 100 mg capsule   3. Sinus complaint R09.89 786.9    .

## 2018-12-19 ENCOUNTER — OFFICE VISIT (OUTPATIENT)
Dept: PRIMARY CARE CLINIC | Age: 63
End: 2018-12-19

## 2018-12-19 VITALS
RESPIRATION RATE: 16 BRPM | HEART RATE: 72 BPM | SYSTOLIC BLOOD PRESSURE: 147 MMHG | TEMPERATURE: 98.4 F | HEIGHT: 66 IN | BODY MASS INDEX: 24.65 KG/M2 | WEIGHT: 153.4 LBS | DIASTOLIC BLOOD PRESSURE: 73 MMHG | OXYGEN SATURATION: 95 %

## 2018-12-19 DIAGNOSIS — R09.81 CONGESTION OF NASAL SINUS: ICD-10-CM

## 2018-12-19 DIAGNOSIS — R05.9 COUGH: ICD-10-CM

## 2018-12-19 DIAGNOSIS — R09.89 SINUS COMPLAINT: Primary | ICD-10-CM

## 2018-12-19 RX ORDER — PREDNISONE 10 MG/1
10 TABLET ORAL SEE ADMIN INSTRUCTIONS
Qty: 21 TAB | Refills: 0 | Status: SHIPPED | OUTPATIENT
Start: 2018-12-19 | End: 2019-03-20

## 2018-12-19 NOTE — PROGRESS NOTES
Subjective:   Luz Maria Plasencia is a 58 y.o. female who complains of congestion and productive cough for 2 months, unchanged since that time. She denies a history of shortness of breath and wheezing. Evaluation to date: none  Has been prescribed 2 antibiotics, 1 omnicef which she completed, the second was doxycycline of which she only took a few days of and has not finished. .   Treatment to date: antibiotics - omnicef and doxycycline the patient stopped doxycycline prior to completion of the total doseage prescribed. Began taking 2 weeks ago. , which have been unable to assess effectiveness. Patient does not smoke cigarettes. Relevant PMH: No pertinent additional PMH. The patient has requested to be given another antibiotic, I have explained that she has had 2 strong antibiotics in the last 2 months and I would rather her go home and finish the course of doxycycline prescribed at her previous visit. The patient asked specifically for a z-pack, indicating that her PCP always gives her one and it fixes it. Patient asked for another steroid dose pack, I have agreed to prescribe one more, but with a 10 mg per tablet strength. Offered the patient Bobbi Orozco and she advised the are not helpful. I have declined to reorder the tussinex. I have spoken at length with this patient about the most like viral illness she has versus a bacterial source. I have provided this patient with a referral to ENT,  [facility]. I have encouraged the patient to follow up with her PCP for follow up if needed.   Patient Active Problem List   Diagnosis Code    Depression F32.9    Anxiety F41.9    Hyperlipidemia E78.5    Allergic rhinitis J30.9    Elevated BP [id number]    GERD (gastroesophageal reflux disease) K21.9    Osteopenia M85.80     Patient Active Problem List    Diagnosis Date Noted    Depression 08/19/2013    Anxiety 08/19/2013    Hyperlipidemia 08/19/2013    Allergic rhinitis 08/19/2013    Elevated BP 2013    GERD (gastroesophageal reflux disease) 2013    Osteopenia 2013     Current Outpatient Medications   Medication Sig Dispense Refill    predniSONE (STERAPRED DS) 10 mg dose pack Take 1 Tab by mouth See Admin Instructions. See administration instruction per 10mg dose pack 21 Tab 0    buPROPion XL (WELLBUTRIN XL) 300 mg XL tablet       albuterol (PROVENTIL HFA, VENTOLIN HFA, PROAIR HFA) 90 mcg/actuation inhaler Take 1 Puff by inhalation every six (6) hours as needed for Wheezing or Shortness of Breath (cough). 1 Inhaler 0    fexofenadine-pseudoephedrine (ALLEGRA-D 24 HOUR) 180-240 mg per tablet Take 1 Tab by mouth daily.  famotidine (PEPCID) 20 mg tablet Take 20 mg by mouth daily.  hydrochlorothiazide (HYDRODIURIL) 25 mg tablet Take 1 Tab by mouth daily. 30 Tab 6    citalopram (CELEXA) 40 mg tablet TAKE ONE TABLET BY MOUTH EVERY DAY 90 Tab 2    simvastatin (ZOCOR) 40 mg tablet TAKE ONE TABLET BY MOUTH EVERY DAY AT BEDTIME 90 Tab 2    alprazolam (XANAX) 0.5 mg tablet Take 0.5 mg by mouth nightly as needed. May take twice a day if needed      CALCIUM CARBONATE/VITAMIN D3 (CALCIUM + D PO) Take 2 Tabs by mouth daily.        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 Objective:     Visit Vitals  /73 (BP 1 Location: Left arm, BP Patient Position: Sitting)   Pulse 72   Temp 98.4 °F (36.9 °C) (Oral)   Resp 16   Ht 5' 6\" (1.676 m)   Wt 153 lb 6.4 oz (69.6 kg)   LMP 06/19/2009   SpO2 95%   BMI 24.76 kg/m²     General:  alert, cooperative, no distress   Eyes: conjunctivae/corneas clear. PERRL, EOM's intact. Fundi benign   Ears: normal TM's and external ear canals AU   Sinuses: Normal paranasal sinuses without tenderness   Mouth:  Lips, mucosa, and tongue normal. Teeth and gums normal   Neck: supple, symmetrical, trachea midline and no adenopathy. Heart: S1 and S2 normal, no murmurs noted. Lungs: clear to auscultation bilaterally   Abdomen: soft, non-tender. Bowel sounds normal. No masses,  no organomegaly        Assessment/Plan:   viral upper respiratory illness  Discussed dx and tx of URIs  Suggested symptomatic OTC remedies. Nasal saline sprays for congestion. Antibiotics per orders. RTC prn. Discussed diagnosis and treatment of viral URIs. Discussed the importance of avoiding unnecessary antibiotic therapy. ICD-10-CM ICD-9-CM    1. Sinus complaint R09.89 786.9 REFERRAL TO ENT-OTOLARYNGOLOGY      predniSONE (STERAPRED DS) 10 mg dose pack   2. Congestion of nasal sinus R09.81 478.19    3. Cough R05 786. 2    .

## 2018-12-19 NOTE — PATIENT INSTRUCTIONS
Cough: Care Instructions  Your Care Instructions    A cough is your body's response to something that bothers your throat or airways. Many things can cause a cough. You might cough because of a cold or the flu, bronchitis, or asthma. Smoking, postnasal drip, allergies, and stomach acid that backs up into your throat also can cause coughs. A cough is a symptom, not a disease. Most coughs stop when the cause, such as a cold, goes away. You can take a few steps at home to cough less and feel better. Follow-up care is a key part of your treatment and safety. Be sure to make and go to all appointments, and call your doctor if you are having problems. It's also a good idea to know your test results and keep a list of the medicines you take. How can you care for yourself at home? · Drink lots of water and other fluids. This helps thin the mucus and soothes a dry or sore throat. Honey or lemon juice in hot water or tea may ease a dry cough. · Take cough medicine as directed by your doctor. · Prop up your head on pillows to help you breathe and ease a dry cough. · Try cough drops to soothe a dry or sore throat. Cough drops don't stop a cough. Medicine-flavored cough drops are no better than candy-flavored drops or hard candy. · Do not smoke. Avoid secondhand smoke. If you need help quitting, talk to your doctor about stop-smoking programs and medicines. These can increase your chances of quitting for good. When should you call for help? Call 911 anytime you think you may need emergency care.  For example, call if:    · You have severe trouble breathing.    Call your doctor now or seek immediate medical care if:    · You cough up blood.     · You have new or worse trouble breathing.     · You have a new or higher fever.     · You have a new rash.    Watch closely for changes in your health, and be sure to contact your doctor if:    · You cough more deeply or more often, especially if you notice more mucus or a change in the color of your mucus.     · You have new symptoms, such as a sore throat, an earache, or sinus pain.     · You do not get better as expected. Where can you learn more? Go to http://jules-stephanie.info/. Enter D279 in the search box to learn more about \"Cough: Care Instructions. \"  Current as of: December 6, 2017  Content Version: 11.8  © 6835-5551 [employer]. Care instructions adapted under license by [employer] (which disclaims liability or warranty for this information). If you have questions about a medical condition or this instruction, always ask your healthcare professional. [address] any warranty or liability for your use of this information.

## 2019-01-15 ENCOUNTER — HOSPITAL ENCOUNTER (OUTPATIENT)
Dept: CT IMAGING | Age: 64
Discharge: HOME | End: 2019-01-15
Attending: OTOLARYNGOLOGY
Payer: COMMERCIAL

## 2019-01-15 DIAGNOSIS — J32.9 CHRONIC INFECTION OF SINUS: ICD-10-CM

## 2019-01-15 PROCEDURE — 70486 CT MAXILLOFACIAL W/O DYE: CPT

## 2019-03-20 NOTE — PERIOP NOTES
[facility]  Ambulatory Surgery Unit  Pre-operative Instructions    Surgery/Procedure Date  3/27            Tentative Arrival Time TBD      1. On the day of your surgery/procedure, please report to the Ambulatory Surgery Unit Registration Desk and sign in at your designated time. The Ambulatory Surgery Unit is located in [facility] on the [facility] side of the [facility] across from the [address] [place] [address]. Please have all of your health insurance cards and a photo ID. 2. You must have someone with you to drive you home, as you should not drive a car for 24 hours following anesthesia. Please make arrangements for a responsible adult friend or family member to stay with you for at least the first 24 hours after your surgery. 3. Do not have anything to eat or drink (including water, gum, mints, coffee, juice) after 11:59 PM  3/27. This may not apply to medications prescribed by your physician. (Please note below the special instructions with medications to take the morning of surgery, if applicable.)    4. We recommend you do not drink any alcoholic beverages for 24 hours before and after your surgery. 5. Contact your surgeons office for instructions on the following medications: non-steroidal anti-inflammatory drugs (i.e. Advil, Aleve), vitamins, and supplements. (Some surgeons will want you to stop these medications prior to surgery and others may allow you to take them)   **If you are currently taking Plavix, Coumadin, Aspirin and/or other blood-thinning agents, contact your surgeon for instructions. ** Your surgeon will partner with the physician prescribing these medications to determine if it is safe to stop or if you need to continue taking. Please do not stop taking these medications without instructions from your surgeon.     6. In an effort to help prevent surgical site infection, we ask that you shower with an anti-bacterial soap (i.e. Dial/Safeguard, or the soap provided to you at your preadmission testing appointment) for 3 days prior to and on the morning of surgery, using a fresh towel after each shower. (Please begin this process with fresh bed linens.) Do not apply any lotions, powders, or deodorants after the shower on the day of your procedure. If applicable, please do not shave the operative site for 48 hours prior to surgery. 7. Wear comfortable clothes. Wear glasses instead of contacts. Do not bring any jewelry or money (other than copays or fees as instructed). Do not wear make-up, particularly mascara, the morning of your surgery. Do not wear nail polish, particularly if you are having foot /hand surgery. Wear your hair loose or down, no ponytails, buns, loreto pins or clips. All body piercings must be removed. 8. You should understand that if you do not follow these instructions your surgery may be cancelled. If your physical condition changes (i.e. fever, cold or flu) please contact your surgeon as soon as possible. 9. It is important that you be on time. If a situation occurs where you may be late, or if you have any questions or problems, please call [phone].    10. Your surgery time may be subject to change. You will receive a phone call the day prior to surgery to confirm your arrival time. Special Instructions: Take all medications and inhalers, as prescribed, on the morning of surgery with a sip of water EXCEPT: none (bring inhaler)    I understand a pre-operative phone call will be made to verify my surgery time. In the event that I am not available, I give permission for a message to be left on my answering service and/or with another person?       yes         ___________________      ___________________      ________________  (Signature of Patient)          (Witness)                   (Date and Time)

## 2019-03-25 ENCOUNTER — HOSPITAL ENCOUNTER (OUTPATIENT)
Dept: SURGERY | Age: 64
Setting detail: OUTPATIENT SURGERY
Discharge: HOME | End: 2019-03-25
Payer: COMMERCIAL

## 2019-03-25 VITALS
SYSTOLIC BLOOD PRESSURE: 148 MMHG | HEART RATE: 72 BPM | DIASTOLIC BLOOD PRESSURE: 70 MMHG | OXYGEN SATURATION: 94 % | RESPIRATION RATE: 17 BRPM | TEMPERATURE: 98.3 F

## 2019-03-25 LAB
ANION GAP SERPL CALC-SCNC: 6 MMOL/L (ref 5–15)
BUN SERPL-MCNC: 18 MG/DL (ref 6–20)
BUN/CREAT SERPL: 18 (ref 12–20)
CALCIUM SERPL-MCNC: 9 MG/DL (ref 8.5–10.1)
CHLORIDE SERPL-SCNC: 105 MMOL/L (ref 97–108)
CO2 SERPL-SCNC: 30 MMOL/L (ref 21–32)
CREAT SERPL-MCNC: 0.98 MG/DL (ref 0.55–1.02)
GLUCOSE SERPL-MCNC: 109 MG/DL (ref 65–100)
POTASSIUM SERPL-SCNC: 3.5 MMOL/L (ref 3.5–5.1)
SODIUM SERPL-SCNC: 141 MMOL/L (ref 136–145)

## 2019-03-25 PROCEDURE — 36415 COLL VENOUS BLD VENIPUNCTURE: CPT

## 2019-03-25 PROCEDURE — 80048 BASIC METABOLIC PNL TOTAL CA: CPT

## 2019-03-25 NOTE — ADVANCED PRACTICE NURSE
ELISA 4 in PAT phone assessment. Pt admits to snoring loud enough to be heard through a closed door, fatigue after a good night's sleep but denies ever having been advised that she has witnessed apnea while sleeping. Results faxed to Dr. Srinivasan Duque for FU after surgery. Confirmation of fax received.

## 2019-03-26 ENCOUNTER — ANESTHESIA EVENT (OUTPATIENT)
Dept: SURGERY | Age: 64
End: 2019-03-26
Payer: COMMERCIAL

## 2019-03-26 NOTE — ANESTHESIA PREPROCEDURE EVALUATION
Relevant Problems No relevant active problems Anesthetic History No history of anesthetic complications Comments: \"Hard to [employer] Review of Systems / Medical History Patient summary reviewed, nursing notes reviewed and pertinent labs reviewed Pulmonary Within defined limits Neuro/Psych Within defined limits Psychiatric history Comments: Anxiety/depression Cardiovascular Within defined limits Hypertension: well controlled Hyperlipidemia Exercise tolerance: >4 METS 
  
GI/Hepatic/Renal 
Within defined limits GERD: poorly controlled Endo/Other Within defined limits Other Findings Comments:   Hx of craniotomy for  benign tumor cerebellum Allergic rhinitis Physical Exam 
 
Airway Mallampati: II 
TM Distance: 4 - 6 cm Neck ROM: normal range of motion Mouth opening: Normal 
 
 Cardiovascular Rhythm: regular Rate: normal 
 
 
Pertinent negatives: No murmur Dental 
 
Dentition: Caps/crowns, Implants, Upper dentition intact and Lower dentition intact Pulmonary Breath sounds clear to auscultation Abdominal 
GI exam deferred Other Findings Anesthetic Plan ASA: 2 Anesthesia type: general 
 
Monitoring Plan: BIS Induction: Intravenous Anesthetic plan and risks discussed with: Patient Previous grade 3 view for AP repair in 2010

## 2019-03-27 ENCOUNTER — HOSPITAL ENCOUNTER (OUTPATIENT)
Age: 64
Setting detail: OUTPATIENT SURGERY
Discharge: HOME | End: 2019-03-27
Attending: OTOLARYNGOLOGY | Admitting: OTOLARYNGOLOGY
Payer: COMMERCIAL

## 2019-03-27 ENCOUNTER — ANESTHESIA (OUTPATIENT)
Dept: SURGERY | Age: 64
End: 2019-03-27
Payer: COMMERCIAL

## 2019-03-27 VITALS
HEIGHT: 65 IN | BODY MASS INDEX: 25.33 KG/M2 | DIASTOLIC BLOOD PRESSURE: 79 MMHG | OXYGEN SATURATION: 92 % | WEIGHT: 152 LBS | SYSTOLIC BLOOD PRESSURE: 148 MMHG | RESPIRATION RATE: 14 BRPM | HEART RATE: 72 BPM | TEMPERATURE: 98.5 F

## 2019-03-27 PROCEDURE — 74011250637 HC RX REV CODE- 250/637: Performed by: OTOLARYNGOLOGY

## 2019-03-27 PROCEDURE — 77030002888 HC SUT CHRMC J&J -A: Performed by: OTOLARYNGOLOGY

## 2019-03-27 PROCEDURE — 77030008684 HC TU ET CUF COVD -B: Performed by: NURSE ANESTHETIST, CERTIFIED REGISTERED

## 2019-03-27 PROCEDURE — 77030003666 HC NDL SPINAL BD -A: Performed by: OTOLARYNGOLOGY

## 2019-03-27 PROCEDURE — 77030006908 HC BLD SNUS SHV MEDT -D: Performed by: OTOLARYNGOLOGY

## 2019-03-27 PROCEDURE — 74011000250 HC RX REV CODE- 250

## 2019-03-27 PROCEDURE — 74011250636 HC RX REV CODE- 250/636: Performed by: ANESTHESIOLOGY

## 2019-03-27 PROCEDURE — 74011250636 HC RX REV CODE- 250/636: Performed by: OTOLARYNGOLOGY

## 2019-03-27 PROCEDURE — 77030011645 HC PK NSL DOYLE MEDT -B: Performed by: OTOLARYNGOLOGY

## 2019-03-27 PROCEDURE — 77030006907 HC BLD SNUS SHV MEDT -C: Performed by: OTOLARYNGOLOGY

## 2019-03-27 PROCEDURE — 77030006689 HC BLD OPHTH BVR BD -A: Performed by: OTOLARYNGOLOGY

## 2019-03-27 PROCEDURE — 77030021352 HC CBL LD SYS DISP COVD -B: Performed by: OTOLARYNGOLOGY

## 2019-03-27 PROCEDURE — 74011250636 HC RX REV CODE- 250/636

## 2019-03-27 PROCEDURE — 76030000020 HC AMB SURG 1.5 TO 2 HR INTENSV-TIER 1: Performed by: OTOLARYNGOLOGY

## 2019-03-27 PROCEDURE — 77030002974 HC SUT PLN J&J -A: Performed by: OTOLARYNGOLOGY

## 2019-03-27 PROCEDURE — 77030002916 HC SUT ETHLN J&J -A: Performed by: OTOLARYNGOLOGY

## 2019-03-27 PROCEDURE — 76060000063 HC AMB SURG ANES 1.5 TO 2 HR: Performed by: OTOLARYNGOLOGY

## 2019-03-27 PROCEDURE — 76210000035 HC AMBSU PH I REC 1 TO 1.5 HR: Performed by: OTOLARYNGOLOGY

## 2019-03-27 PROCEDURE — 77030020256 HC SOL INJ NACL 0.9%  500ML: Performed by: OTOLARYNGOLOGY

## 2019-03-27 PROCEDURE — 77030021668 HC NEB PREFIL KT VYRM -A: Performed by: OTOLARYNGOLOGY

## 2019-03-27 PROCEDURE — 77030020255 HC SOL INJ LR 1000ML BG: Performed by: OTOLARYNGOLOGY

## 2019-03-27 PROCEDURE — 77030029985: Performed by: OTOLARYNGOLOGY

## 2019-03-27 PROCEDURE — C1887 CATHETER, GUIDING: HCPCS | Performed by: OTOLARYNGOLOGY

## 2019-03-27 PROCEDURE — 77030036727 HC DEV INFL BLN SNUS SE DISP ACCL -B: Performed by: OTOLARYNGOLOGY

## 2019-03-27 PROCEDURE — 76210000046 HC AMBSU PH II REC FIRST 0.5 HR: Performed by: OTOLARYNGOLOGY

## 2019-03-27 PROCEDURE — 77030034511 HC PK NSL XEROGEL DISOLV ENTM -C: Performed by: OTOLARYNGOLOGY

## 2019-03-27 PROCEDURE — 77030018836 HC SOL IRR NACL ICUM -A: Performed by: OTOLARYNGOLOGY

## 2019-03-27 PROCEDURE — 74011000250 HC RX REV CODE- 250: Performed by: OTOLARYNGOLOGY

## 2019-03-27 PROCEDURE — 88304 TISSUE EXAM BY PATHOLOGIST: CPT

## 2019-03-27 PROCEDURE — 77030021678 HC GLIDESCP STAT DISP VERT -B: Performed by: NURSE ANESTHETIST, CERTIFIED REGISTERED

## 2019-03-27 RX ORDER — ONDANSETRON 2 MG/ML
INJECTION INTRAMUSCULAR; INTRAVENOUS AS NEEDED
Status: DISCONTINUED | OUTPATIENT
Start: 2019-03-27 | End: 2019-03-27 | Stop reason: HOSPADM

## 2019-03-27 RX ORDER — MORPHINE SULFATE 10 MG/ML
2 INJECTION, SOLUTION INTRAMUSCULAR; INTRAVENOUS
Status: DISCONTINUED | OUTPATIENT
Start: 2019-03-27 | End: 2019-03-27 | Stop reason: HOSPADM

## 2019-03-27 RX ORDER — LIDOCAINE HYDROCHLORIDE AND EPINEPHRINE 10; 10 MG/ML; UG/ML
INJECTION, SOLUTION INFILTRATION; PERINEURAL AS NEEDED
Status: DISCONTINUED | OUTPATIENT
Start: 2019-03-27 | End: 2019-03-27 | Stop reason: HOSPADM

## 2019-03-27 RX ORDER — PROPOFOL 10 MG/ML
INJECTION, EMULSION INTRAVENOUS AS NEEDED
Status: DISCONTINUED | OUTPATIENT
Start: 2019-03-27 | End: 2019-03-27 | Stop reason: HOSPADM

## 2019-03-27 RX ORDER — SODIUM CHLORIDE 0.9 % (FLUSH) 0.9 %
5-40 SYRINGE (ML) INJECTION AS NEEDED
Status: DISCONTINUED | OUTPATIENT
Start: 2019-03-27 | End: 2019-03-27 | Stop reason: HOSPADM

## 2019-03-27 RX ORDER — SODIUM CHLORIDE 0.9 % (FLUSH) 0.9 %
5-40 SYRINGE (ML) INJECTION EVERY 8 HOURS
Status: DISCONTINUED | OUTPATIENT
Start: 2019-03-27 | End: 2019-03-27 | Stop reason: HOSPADM

## 2019-03-27 RX ORDER — CEFAZOLIN SODIUM 1 G/3ML
INJECTION, POWDER, FOR SOLUTION INTRAMUSCULAR; INTRAVENOUS AS NEEDED
Status: DISCONTINUED | OUTPATIENT
Start: 2019-03-27 | End: 2019-03-27 | Stop reason: HOSPADM

## 2019-03-27 RX ORDER — SODIUM CHLORIDE, SODIUM LACTATE, POTASSIUM CHLORIDE, CALCIUM CHLORIDE 600; 310; 30; 20 MG/100ML; MG/100ML; MG/100ML; MG/100ML
INJECTION, SOLUTION INTRAVENOUS
Status: DISCONTINUED | OUTPATIENT
Start: 2019-03-27 | End: 2019-03-27

## 2019-03-27 RX ORDER — LIDOCAINE HYDROCHLORIDE 10 MG/ML
0.1 INJECTION, SOLUTION EPIDURAL; INFILTRATION; INTRACAUDAL; PERINEURAL AS NEEDED
Status: DISCONTINUED | OUTPATIENT
Start: 2019-03-27 | End: 2019-03-27 | Stop reason: HOSPADM

## 2019-03-27 RX ORDER — ONDANSETRON 2 MG/ML
4 INJECTION INTRAMUSCULAR; INTRAVENOUS AS NEEDED
Status: DISCONTINUED | OUTPATIENT
Start: 2019-03-27 | End: 2019-03-27 | Stop reason: HOSPADM

## 2019-03-27 RX ORDER — ROCURONIUM BROMIDE 10 MG/ML
INJECTION, SOLUTION INTRAVENOUS AS NEEDED
Status: DISCONTINUED | OUTPATIENT
Start: 2019-03-27 | End: 2019-03-27 | Stop reason: HOSPADM

## 2019-03-27 RX ORDER — SODIUM CHLORIDE 9 MG/ML
INJECTION, SOLUTION INTRAVENOUS
Status: COMPLETED | OUTPATIENT
Start: 2019-03-27 | End: 2019-03-27

## 2019-03-27 RX ORDER — OXYMETAZOLINE HCL 0.05 %
SPRAY, NON-AEROSOL (ML) NASAL AS NEEDED
Status: DISCONTINUED | OUTPATIENT
Start: 2019-03-27 | End: 2019-03-27 | Stop reason: HOSPADM

## 2019-03-27 RX ORDER — MIDAZOLAM HYDROCHLORIDE 1 MG/ML
INJECTION, SOLUTION INTRAMUSCULAR; INTRAVENOUS AS NEEDED
Status: DISCONTINUED | OUTPATIENT
Start: 2019-03-27 | End: 2019-03-27 | Stop reason: HOSPADM

## 2019-03-27 RX ORDER — FENTANYL CITRATE 50 UG/ML
INJECTION, SOLUTION INTRAMUSCULAR; INTRAVENOUS AS NEEDED
Status: DISCONTINUED | OUTPATIENT
Start: 2019-03-27 | End: 2019-03-27 | Stop reason: HOSPADM

## 2019-03-27 RX ORDER — BACITRACIN ZINC 500 UNIT/G
OINTMENT (GRAM) TOPICAL AS NEEDED
Status: DISCONTINUED | OUTPATIENT
Start: 2019-03-27 | End: 2019-03-27 | Stop reason: HOSPADM

## 2019-03-27 RX ORDER — CEFAZOLIN SODIUM/WATER 2 G/20 ML
2 SYRINGE (ML) INTRAVENOUS ONCE
Status: DISCONTINUED | OUTPATIENT
Start: 2019-03-27 | End: 2019-03-27 | Stop reason: HOSPADM

## 2019-03-27 RX ORDER — DIPHENHYDRAMINE HYDROCHLORIDE 50 MG/ML
12.5 INJECTION, SOLUTION INTRAMUSCULAR; INTRAVENOUS
Status: DISCONTINUED | OUTPATIENT
Start: 2019-03-27 | End: 2019-03-27 | Stop reason: HOSPADM

## 2019-03-27 RX ORDER — SUCCINYLCHOLINE CHLORIDE 20 MG/ML INJECTION SOLUTION
SOLUTION AS NEEDED
Status: DISCONTINUED | OUTPATIENT
Start: 2019-03-27 | End: 2019-03-27 | Stop reason: HOSPADM

## 2019-03-27 RX ORDER — SODIUM CHLORIDE, SODIUM LACTATE, POTASSIUM CHLORIDE, CALCIUM CHLORIDE 600; 310; 30; 20 MG/100ML; MG/100ML; MG/100ML; MG/100ML
25 INJECTION, SOLUTION INTRAVENOUS CONTINUOUS
Status: DISCONTINUED | OUTPATIENT
Start: 2019-03-27 | End: 2019-03-27 | Stop reason: HOSPADM

## 2019-03-27 RX ORDER — FENTANYL CITRATE 50 UG/ML
25 INJECTION, SOLUTION INTRAMUSCULAR; INTRAVENOUS
Status: DISCONTINUED | OUTPATIENT
Start: 2019-03-27 | End: 2019-03-27 | Stop reason: HOSPADM

## 2019-03-27 RX ORDER — OXYMETAZOLINE HCL 0.05 %
3 SPRAY, NON-AEROSOL (ML) NASAL ONCE
Status: COMPLETED | OUTPATIENT
Start: 2019-03-27 | End: 2019-03-27

## 2019-03-27 RX ORDER — SUCCINYLCHOLINE CHLORIDE 20 MG/ML
INJECTION INTRAMUSCULAR; INTRAVENOUS AS NEEDED
Status: DISCONTINUED | OUTPATIENT
Start: 2019-03-27 | End: 2019-03-27 | Stop reason: HOSPADM

## 2019-03-27 RX ORDER — DEXAMETHASONE SODIUM PHOSPHATE 10 MG/ML
10 INJECTION INTRAMUSCULAR; INTRAVENOUS ONCE
Status: COMPLETED | OUTPATIENT
Start: 2019-03-27 | End: 2019-03-27

## 2019-03-27 RX ADMIN — FENTANYL CITRATE 25 MCG: 50 INJECTION, SOLUTION INTRAMUSCULAR; INTRAVENOUS at 13:02

## 2019-03-27 RX ADMIN — LIDOCAINE HYDROCHLORIDE 0.1 ML: 10 INJECTION, SOLUTION EPIDURAL; INFILTRATION; INTRACAUDAL; PERINEURAL at 09:38

## 2019-03-27 RX ADMIN — FENTANYL CITRATE 25 MCG: 50 INJECTION, SOLUTION INTRAMUSCULAR; INTRAVENOUS at 12:37

## 2019-03-27 RX ADMIN — SUCCINYLCHOLINE CHLORIDE 20 MG/ML INJECTION SOLUTION 120 MG: SOLUTION at 10:13

## 2019-03-27 RX ADMIN — ROCURONIUM BROMIDE 10 MG: 10 INJECTION, SOLUTION INTRAVENOUS at 10:13

## 2019-03-27 RX ADMIN — SODIUM CHLORIDE, SODIUM LACTATE, POTASSIUM CHLORIDE, AND CALCIUM CHLORIDE: 600; 310; 30; 20 INJECTION, SOLUTION INTRAVENOUS at 09:53

## 2019-03-27 RX ADMIN — FENTANYL CITRATE 25 MCG: 50 INJECTION, SOLUTION INTRAMUSCULAR; INTRAVENOUS at 13:14

## 2019-03-27 RX ADMIN — FENTANYL CITRATE 100 MCG: 50 INJECTION, SOLUTION INTRAMUSCULAR; INTRAVENOUS at 10:13

## 2019-03-27 RX ADMIN — ONDANSETRON 4 MG: 2 INJECTION INTRAMUSCULAR; INTRAVENOUS at 11:32

## 2019-03-27 RX ADMIN — SODIUM CHLORIDE, SODIUM LACTATE, POTASSIUM CHLORIDE, AND CALCIUM CHLORIDE 25 ML/HR: 600; 310; 30; 20 INJECTION, SOLUTION INTRAVENOUS at 09:40

## 2019-03-27 RX ADMIN — SUCCINYLCHOLINE CHLORIDE 120 MG: 20 INJECTION INTRAMUSCULAR; INTRAVENOUS at 10:09

## 2019-03-27 RX ADMIN — FENTANYL CITRATE 25 MCG: 50 INJECTION, SOLUTION INTRAMUSCULAR; INTRAVENOUS at 12:45

## 2019-03-27 RX ADMIN — MEPERIDINE HYDROCHLORIDE 12.5 MG: 25 INJECTION, SOLUTION INTRAMUSCULAR; INTRAVENOUS; SUBCUTANEOUS at 12:30

## 2019-03-27 RX ADMIN — CEFAZOLIN SODIUM 2 G: 1 INJECTION, POWDER, FOR SOLUTION INTRAMUSCULAR; INTRAVENOUS at 10:15

## 2019-03-27 RX ADMIN — PROPOFOL 150 MG: 10 INJECTION, EMULSION INTRAVENOUS at 10:13

## 2019-03-27 RX ADMIN — MEPERIDINE HYDROCHLORIDE 12.5 MG: 25 INJECTION, SOLUTION INTRAMUSCULAR; INTRAVENOUS; SUBCUTANEOUS at 12:20

## 2019-03-27 RX ADMIN — OXYMETAZOLINE HYDROCHLORIDE 3 SPRAY: 5 SPRAY NASAL at 09:40

## 2019-03-27 RX ADMIN — DEXAMETHASONE SODIUM PHOSPHATE 10 MG: 10 INJECTION, SOLUTION INTRAMUSCULAR; INTRAVENOUS at 09:49

## 2019-03-27 RX ADMIN — MIDAZOLAM HYDROCHLORIDE 2 MG: 1 INJECTION, SOLUTION INTRAMUSCULAR; INTRAVENOUS at 10:04

## 2019-03-27 NOTE — ANESTHESIA POSTPROCEDURE EVALUATION
Procedure(s): BILATERAL ENDOSCOPIC WITH IMAGE GUIDANCE, ETHMOIDECTOMY, EXPLORATION NASAL FRONTAL DUCT, MAXILLARY ANSTROSTOMY WITH SEPTOPLASTY, TURBINATE REDUCTION, BALLOON SINOPLASTY OF RIGHT FRONTAL SINUS. general 
 
Anesthesia Post Evaluation Patient location during evaluation: PACU Note status: Adequate. Level of consciousness: responsive to verbal stimuli and sleepy but conscious Pain management: satisfactory to patient Airway patency: patent Anesthetic complications: no 
Cardiovascular status: acceptable Respiratory status: acceptable Hydration status: acceptable Comments: +Post-Anesthesia Evaluation and Assessment Patient: Nelda Buerger MRN: [id number]  SSN: [id number] YOB: 1955  Age: 71 y.o. Sex: female Cardiovascular Function/Vital Signs BP (!) 158/96 (BP 1 Location: Left arm, BP Patient Position: At rest)   Pulse 74   Temp 36.9 °C (98.5 °F)   Resp 14   Ht 5' 4.5\" (1.638 m)   Wt 68.9 kg (152 lb)   SpO2 98%   BMI 25.69 kg/m² Patient is status post Procedure(s): BILATERAL ENDOSCOPIC WITH IMAGE GUIDANCE, ETHMOIDECTOMY, EXPLORATION NASAL FRONTAL DUCT, MAXILLARY ANSTROSTOMY WITH SEPTOPLASTY, TURBINATE REDUCTION, BALLOON SINOPLASTY OF RIGHT FRONTAL SINUS. Nausea/Vomiting: Controlled. Postoperative hydration reviewed and adequate. Pain: 
Pain Scale 1: FLACC (03/27/19 1159) Pain Intensity 1: 0 (03/27/19 0950) Managed. Neurological Status:  
Neuro (WDL): Exceptions to WDL (03/27/19 1159) At baseline. Mental Status and Level of Consciousness: Arousable. Pulmonary Status:  
O2 Device: Non-rebreather mask (03/27/19 1159) Adequate oxygenation and airway patent. Complications related to anesthesia: None Post-anesthesia assessment completed. No concerns. Signed By: Hiral Torres MD  
 3/27/2019 Post anesthesia nausea and vomiting:  controlled Vitals Value Taken Time /96 3/27/2019 11:59 AM  
 Temp 36.9 °C (98.5 °F) 3/27/2019 11:59 AM  
Pulse 74 3/27/2019 11:59 AM  
Resp 14 3/27/2019 11:59 AM  
SpO2 98 % 3/27/2019 11:59 AM

## 2019-03-27 NOTE — PERIOP NOTES
Lorin Shone  1955  [id number]    Situation:  Verbal report given from:KB Gentile CRNA and SHIVA Bailey RN  Procedure: Procedure(s):  BILATERAL ENDOSCOPIC WITH IMAGE GUIDANCE, ETHMOIDECTOMY, EXPLORATION NASAL FRONTAL DUCT, MAXILLARY ANSTROSTOMY WITH SEPTOPLASTY, TURBINATE REDUCTION, BALLOON SINOPLASTY OF RIGHT FRONTAL SINUS    Background:    Preoperative diagnosis: CHRONIC SINUSITIS, DEVIATED SEPTUM, TURBINATE HYPERTROPHY    Postoperative diagnosis: CHRONIC SINUSITIS, DEVIATED SEPTUM, TURBINATE HYPERTROPHY    :  Dr. Christa Flor    Assistant(s): Circ-1: Prashant Tran  Scrub Tech-1: Javon Garzon  Scrub Tech-2: Cherelle PERSON    Specimens:   ID Type Source Tests Collected by Time Destination   1 : Contents of left middle meatus Preservative Sinus  Markus Ireland MD 3/27/2019 1057 Pathology   3 : Contents of right middle meatus Preservative Sinus  Markus Ireland MD 3/27/2019 1058 Pathology       Assessment:  Intra-procedure medications         Anesthesia gave intra-procedure sedation and medications, see anesthesia flow sheet     Intravenous fluids: LR@ KVO     Vital signs stable       Recommendation:    Permission to share finding with  Xiomara Joseph

## 2019-03-27 NOTE — BRIEF OP NOTE
BRIEF OPERATIVE NOTE    Date of Procedure: 3/27/2019   Preoperative Diagnosis: CHRONIC BILATERAL MAXILLARY, ETHMOID, RIGHT FRONTAL SINUSITIS, DEVIATED SEPTUM, TURBINATE HYPERTROPHY  Postoperative Diagnosis: CHRONIC BILATERAL MAXILLARY, ETHMOID, RIGHT FRONTAL SINUSITIS, DEVIATED SEPTUM, TURBINATE HYPERTROPHY    Procedure(s):  BILATERAL ENDOSCOPIC IMAGE GUIDANCE, ETHMOIDECTOMY WITH EXPLORATION AND BALLOON SINUPLASTY OF RIGHT NASOFRONTAL DUCT, BILATERAL ENDOSCOPIC MAXILLARY ANTROSTOMY, SEPTOPLASTY, BILATERAL INFERIOR TURBINATE REDUCTION,   Surgeon(s) and Role:     * Ria Post MD - Primary         Surgical Assistant: none    Surgical Staff:  Circ-1: Loy Alcala  Scrub Tech-1: Kai Vasquez  Scrub Tech-2: Dalia Pickard  Event Time In Time Out   Incision Start 1038    Incision Close 1149      Anesthesia: General   Estimated Blood Loss: 40 ml  Specimens:   ID Type Source Tests Collected by Time Destination   1 : Contents of left middle meatus Preservative Sinus  Ria Post MD 3/27/2019 1057 Pathology   3 : Contents of right middle meatus Preservative Sinus  Ria Post MD 3/27/2019 1058 Pathology      Findings: as expected   Complications: none apparent  Implants: * No implants in log *

## 2019-03-27 NOTE — DISCHARGE INSTRUCTIONS
[facility], [place] Joyce. Janusz Samson M.D., Ph.D., F.A.C.S. Melinda Ruelas[place], [address] [facility]  [phone]    POST OPERATIVE INSTRUCTIONS-SINUS SURGERY/SEPTOPLASTY    The following instructions are designed to help you recover from surgery as easily as possible. Taking care of yourself can prevent complications. It is very important that you read this sheet often and follow the instructions carefully while you are at home. Your doctor or nurse will be happy to answer any questions. 1. DIET:  You may resume your normal diet in 24 hours. We suggest nothing heavy or greasy and avoid dairy products the first 24 hours. It is important to remember that good overall diet and health promotes healing. 2.  ACTIVITY RESTRICTIONS:  The following guidelines should be followed until your doctor tells you otherwise:   Do not blow your nose, sniff and if you have to sneeze or cough-do with mouth open   Do not lift anything over five pounds.  Do not bend over. Avoid doing things like tying your shoes or picking up things off the floor.  Do not do heavy exercise or play contact sports   Do not strain for a bowel movement. If you are constipated, take a stool softener or a gentle laxative.  Go back to work or school in one week. 3.  HOW TO TAKE CARE OF YOUR NOSE AND SINUSES:  Take the antibiotic prescribed by your doctor. Call if you have any problems or questions. We expect bloody drainage from your nose. You are to change the dressing below your nose as needed and expect to do this 10 to 12 times the first day. We want the drainage to come forward and not down your throat. We suggest you rest and sleep elevated on several pillows on your side. You will have less drainage each day and the swelling will reduce in several days. Call the office if you have bloody saturated gauze more than once an hour.     Clean the nasal openings twice daily with a Q-tip soaked in hydrogen peroxide until clear of all crusts. Take the pain medication prescribed by your doctor as needed for discomfort. No Aspirin, Motrin, Alleve, Advil or similar products for 3 weeks. You make take Tylenol (Acetaminophen) when you no longer need prescribed medication. Do not take Tylenol on top of pain medication because Tylenol is in the pain medication. Avoid smoke, dust, fumes or anything else that might irritate the nose. Protect yourself from any unexpected injury to your nose or face, such as being hit by small children or a restless bedmate. You may find that a cool mist room humidifier is helpful in decreasing the amount of dryness in your nose and mouth. After surgery you should use a nasal saline spray such as Ayr or Simply Saline. Use 4 sprays in each nostril every two hours while awake to help prevent crusts from forming in your nose. 4. RETURN APPOINTMENT:   You need to make a follow-up appointment with your doctor in 5 days. At this time your nose will be gently and carefully examined and your packing will be removed. 5. NOTIFY YOUR DOCTOR IF YOU HAVE:    A sudden increase in the amount of bleeding from the nose   A fever above 101 degrees F, which persists despite increasing the amount of fluids you take. A person with a fever should try to drink one cup of water each waking hour.  Persistent sharp pain that is not relieved by the pain medication you receive.  Increased swelling or redness of your nose. If you have any questions or concerns following your surgery do not hesitate to contact our office at [phone]    DO  [facility], [address] [facility], [address] [facility] TAKE NARCOTIC PAIN MEDICATIONS WITH FOOD     Narcotics tend to be constipating, we suggest taking a stool softener such as Colace or Miralax (follow package instructions).   DO NOT DRIVE WHILE TAKING NARCOTIC PAIN MEDICATIONS. DO NOT TAKE SLEEPING MEDICATIONS OR ANTIANXIETY MEDICATIONS WHILE TAKING NARCOTIC PAIN MEDICATIONS,  ESPECIALLY THE NIGHT OF ANESTHESIA! CPAP PATIENTS BE SURE TO WEAR MACHINE WHENEVER NAPPING OR SLEEPING! DISCHARGE SUMMARY from Nurse    The following personal items collected during your admission are returned to you:   Dental Appliance: Dental Appliances: Other (comment)  Vision: Visual Aid: Glasses, With patient  Hearing Aid:    Jewelry: Jewelry: None  Clothing: Clothing: Other (comment)(belonging bag)  Other Valuables: Other Valuables: None  Valuables sent to safe:        PATIENT INSTRUCTIONS:    After General Anesthesia or Intravenous Sedation, for 24 hours or while taking prescription Narcotics:        Someone should be with you for the next 24 hours. For your own safety, a responsible adult must drive you home. · Limit your activities  · Recommended activity: Rest today, up with assistance today. Do not climb stairs or shower unattended for the next 24 hours. · Please start with a soft bland diet and advance as tolerated (no nausea) to regular diet. · If you have a sore throat you should try the following: fluids, warm salt water gargles, or throat lozenges. If it does not improve after several days please follow up with your primary physician. · Do not drive and operate hazardous machinery  · Do not make important personal or business decisions  · Do  not drink alcoholic beverages  · If you have not urinated within 8 hours after discharge, please contact your surgeon on call.     Report the following to your surgeon:  · Excessive pain, swelling, redness or odor of or around the surgical area  · Temperature over 100.5  · Nausea and vomiting lasting longer than 4 hours or if unable to take medications  · Any signs of decreased circulation or nerve impairment to extremity: change in color, persistent  numbness, tingling, coldness or increase pain      · You will receive a Post Operative Call from one of the Recovery Room Nurses on the day after your surgery to check on you. It is very important for us to know how you are recovering after your surgery. If you have an issue or need to speak with someone, please call your surgeon, do not wait for the post operative call. · You may receive an e-mail or letter in the mail from [facility] regarding your experience with us in the Ambulatory Surgery Unit. Your feedback is valuable to us and we appreciate your participation in the survey. · If the above instructions are not adequate or you are having problems after your surgery, call the physician at their office number. · We wish you a speedy recovery ? What to do at Home:      *  Please give a list of your current medications to your Primary Care Provider. *  Please update this list whenever your medications are discontinued, doses are      changed, or new medications (including over-the-counter products) are added. *  Please carry medication information at all times in case of emergency situations. These are general instructions for a healthy lifestyle:    No smoking/ No tobacco products/ Avoid exposure to second hand smoke    Surgeon General's Warning:  Quitting smoking now greatly reduces serious risk to your health. Obesity, smoking, and sedentary lifestyle greatly increases your risk for illness    A healthy diet, regular physical exercise & weight monitoring are important for maintaining a healthy lifestyle    You may be retaining fluid if you have a history of heart failure or if you experience any of the following symptoms:  Weight gain of 3 pounds or more overnight or 5 pounds in a week, increased swelling in our hands or feet or shortness of breath while lying flat in bed. Please call your doctor as soon as you notice any of these symptoms; do not wait until your next office visit.     Recognize signs and symptoms of STROKE:    B - Balance  E - Eyes    F-  Face looks uneven  A-  Arms unable to move or move even  S-  Speech slurred or non-existent  T-  Time-call 911 as soon as signs and symptoms begin-DO NOT go       Back to bed or wait to see if you get better-TIME IS BRAIN. If you have not received your influenza and/or pneumococcal vaccine, please follow up with your primary care physician. The discharge information has been reviewed with the patient and caregiver. The patient and caregiver verbalized understanding. Dennis Rosario THROMBOSIS AND PULMONARY EMBOLUS    SURGICAL PATIENTS  Surgical patients are the #1 risk for DVT and PE. WHAT IS DVT? WHAT IS PE?  DVT is a serious condition where blood clots develop deep in the veins of the legs. PE occurs when a blood clot breaks loose from the wall of a vein and travels to the lungs blocking the pulmonary artery or one of its branches impairing blood flow from the heart, which could result in death.   RISK FACTORS   Surgery lasting longer than 45 minutes   History of inflammatory bowel disease   Oral contraceptive or hormone replacement therapy   Immobilization   Varicose veins / swollen legs   Smoking    CHF / Acute MI / Irregular heart beat   Family history of thrombosis   General anesthesia greater than 2 hours   Obesity   Infection of less than one month   Less than 1 month postpartum   COPD / Pneumonia   Arthroscopic surgery   Malignancy / cancer   Spine surgery   Blood abnormalities   Stroke / Paralysis / Coma    SIGNS AND SYMPTOMS OF DEEP VEIN TROMBOSIS  Usually occurs in one leg, above or below the knee   Swelling - one calf or thigh may be larger than the other   Feeling increased warmth in the area of the leg that is swollen or painful   Leg pain, which may increase when standing or walking   Swelling along the vein of the leg   When swollen areas is pressed with a finger, a depression may remain   Tenderness of the leg that may be confined to one area   Change in leg color (bluish or red)    SIGNS AND SYMPTOMS OF PULMONARY EMBOLUS   Chest pain that gets worse with deep breath, coughing or chest movement   Coughing up blood   Sweating   Shortness of breath or difficulty breathing   Rapid heart beat   Lightheadedness    HOW TO REDUCE THE POSSIBLE RISK OF DVT   Exercise - simple activities as rotating ankles and wrists, wiggling toes and fingers, tightening and relaxing muscles in calves and thighs promotes circulation while recovering from surgery. Please do these exercises every hour during waking hours   Take mediation as prescribed by your physician (Lovenox, Coumadin, Aspirin)   Resume your normal activities as soon as your doctor advises you to do so.  Remember, when traveling, to [place] your legs frequently.         PATIENTS WHO BELIEVE THEY MAY BE EXPERIENCING SIGNS AND SYMPTOMS OF DVT OR PE SHOULD SEEK MEDICAL HELP IMMEDIATELY

## 2019-03-27 NOTE — PERIOP NOTES
XeroGel Lot # [id number] Exp. 09/04/2020 X 2 used. Dressing consist of multani nasal splints inserted bilaterally with a 2 X 2 gauze sponge secured across the openings of the nose with a piece of plastic tape.

## 2019-03-28 NOTE — OP NOTES
[address]  OPERATIVE REPORT    Name:  Juliette Jeronimo  MR#:  [id number]  :  1955  ACCOUNT #:  [de-identified]  DATE OF SERVICE:  2019      PREOPERATIVE DIAGNOSES:  Chronic bilateral maxillary, ethmoid, and right frontal sinusitis; septal deviation; turbinate hypertrophy. POSTOPERATIVE DIAGNOSES:  Chronic bilateral maxillary, ethmoid, and right frontal sinusitis; septal deviation; turbinate hypertrophy. PROCEDURE PERFORMED:  Bilateral endoscopic image guidance ethmoidectomy with exploration and balloon sinuplasty of right nasal frontal duct, bilateral endoscopic maxillary antrostomy, septoplasty, bilateral inferior turbinate submucosal resection with therapeutic outfracture. SURGEON:  Rosi Alcaraz MD    ASSISTANT:  None. ANESTHESIA:  General endotracheal tube anesthesia. COMPLICATIONS:  None apparent. SPECIMENS REMOVED:  1. Contents of left middle meatus. 2.  Contents of right middle meatus. IMPLANTS:  Temporary Waters splints. ESTIMATED BLOOD LOSS:  40 mL    INDICATIONS:  The patient is a 76-year-old female with a long history of chronic nasal airway obstruction and chronic sinusitis difficulties which have been refractory to medical therapy. Preoperatively, the alternatives, potential benefits, and possible risks of the procedure were explained to the patient who understood and requested to proceed. PROCEDURE:  The patient received 0.25% oxymetazoline in the preanesthesia holding area. The patient was brought to the operating room, placed supine on the operating table, and following the smooth induction of general endotracheal tube anesthesia, the table was turned 90 degrees and the patient was positioned for operation. The Fusion image guidance system was applied and tested and found to function satisfactorily. Oxymetazoline on neurosurgical cottonoids was placed intranasally. A routine Betadine prep and drape was carried out.     Using a combination of 0-degree and 30-degree nasal telescopes, the nasal cavity was examined. 1% Xylocaine with epinephrine was injected to the middle turbinate, middle meatus, and sphenopalatine artery bilaterally. After satisfactory vasoconstriction was observed, the left middle turbinate was medialized. The image guidance system allowed confirmatory localization of the left nasolacrimal duct. A sharp sickle knife was used to palpate the uncinate process and make an uncinate incision posterior to the duct. The left uncinate process was removed to create a maxillary antrostomy which was subsequently optimized with a Straightshot handpiece and 4 mm sinus blade. The anterior ethmoid bulla was entered with a straight Blakesley forceps, and anterior and posterior ethmoidectomy was carried out with the Straightshot handpiece and 4 mm sinus blade, utilizing image guidance to allow confirmation and preservation of the lamina papyracea and fovea ethmoidalis. Once a satisfactory left ethmoid cavity was completed, attention was turned to septoplasty. 1% Xylocaine with epinephrine was injected in a submucoperichondrial and submucoperiosteal fashion from anterior to posterior. After satisfactory vasoconstriction was observed, a left hemitransfixion incision was made, and submucoperichondrial and submucoperiosteal elevation of membranes was carried out from anterior to posterior. The osteocartilaginous junction was identified and  with a Bro calibrated elevator. The deformed and obstructing portions of posterior osseous septum were removed with a Antonio-Gooden bone punch. A spur arising from the maxillary crest was removed with a 4 mm osteotome. The quadrangular cartilage appeared to reside in the midline, and the septoplasty was concluded with interrupted 4-0 chromic and a running 4-0 plain gut suture to approximate septal membranes. Attention was then turned to the right sinus cavity.   Using the 30-degree nasal telescope and the frontal sinus seeker, the nasofrontal duct could be located. The balloon sinuplasty equipment was brought to the surgical field, and with passage of the light catheter through the nasofrontal duct on the right side, satisfactory, confident illumination of the anterior table of the frontal bone was observed. The balloon was advanced over the light catheter and inflated to 12 atmospheres for 12 seconds before deflation and removal.  Attention was turned to right maxillary antrostomy. The nasolacrimal duct was confirmed in this location, and the sharp sickle knife was used to palpate the uncinate process prior to making an uncinate incision. The uncinate process was then removed, and with use of the Straightshot handpiece, further optimization of the mucosal margins of the antrostomy was carried out. The anterior ethmoid bulla was entered with a straight Blakesley forceps, and subsequent anterior and posterior ethmoidectomy was carried out on the right side with use of the image guidance to allow identification and preservation of the lamina papyracea and fovea ethmoidalis. Once a satisfactory sinus cavity was observed, attention was turned to inferior turbinate reduction. Using the Straightshot handpiece with a 2 mm turbinate blade attached, a submucosal resection of the right and left inferior turbinate soft tissues was carried out. After adequate soft tissue had been resected, a Boies elevator was used to perform a therapeutic outfracture of the inferior turbinate on each side. A satisfactory nasal airway was subsequently observed, and attention was turned to placement of splints and packing. Xerogel was placed to the middle meatus on each side to provide sinus packing. Waters splints were coated with bacitracin and placed on each side of the nasal cavity before securing these with a 4-0 nylon mattress suture.   Bacitracin-coated Gelfoam was placed on each side of the nasal cavity to further support the inferior turbinates in a lateralized position. As the procedure was concluded, the patient was aroused from general endotracheal tube anesthesia, extubated in the operating room, and transferred to the recovery area in satisfactory condition.       Vincent Stock MD [id number]  D:  03/27/2019 12:18  T:  03/27/2019 12:24  JOB #:  [id number]  CC:  MD Kimberly Melgar MD

## 2022-01-23 ENCOUNTER — HOSPITAL ENCOUNTER (EMERGENCY)
Age: 67
Discharge: HOME | End: 2022-01-23
Attending: EMERGENCY MEDICINE
Payer: COMMERCIAL

## 2022-01-23 VITALS
SYSTOLIC BLOOD PRESSURE: 160 MMHG | HEART RATE: 100 BPM | OXYGEN SATURATION: 99 % | TEMPERATURE: 98.8 F | RESPIRATION RATE: 20 BRPM | DIASTOLIC BLOOD PRESSURE: 88 MMHG

## 2022-01-23 DIAGNOSIS — R04.0 EPISTAXIS: Primary | ICD-10-CM

## 2022-01-23 PROCEDURE — 99282 EMERGENCY DEPT VISIT SF MDM: CPT

## 2022-01-23 NOTE — ED NOTES
Munira FLORES and Dr. Shelley Dee reviewed discharge instructions with the patient. The patient verbalized understanding. All questions and concerns were addressed. The patient declined a wheelchair and is discharged ambulatory in the care of family members with instructions and prescriptions in hand. Pt is alert and oriented x 4. Respirations are clear and unlabored.

## 2022-01-23 NOTE — ED PROVIDER NOTES
EMERGENCY DEPARTMENT HISTORY AND PHYSICAL EXAM      Date: 1/23/2022  Patient Name: Harpreet Munson    History of Presenting Illness     Chief Complaint   Patient presents with    Epistaxis     Pt says her nose started bleeding 20 mins ago and hasn't stopped       History Provided By: Patient and Patient's     HPI: Harpreet Munson, 78 y.o. female presents to the ED with cc of epistaxis. Patient states she has been treated for a fungal infection involving her nose with Levaquin and steroids. She has been flushing her nose since being diagnosed with this illness. Today, she started to have bleeding from her right naris which lasted for 20 minutes and did not stop. It has stopped since she arrived in the ER. She is also complaining of a moderate headache which started when her epistaxis occurred. She denies trauma, fever, chest pain, cough, shortness of breath, abdominal pain or nausea. She is not on a blood thinner. She denies lightheadedness or dizziness. There are no other complaints, changes, or physical findings at this time. PCP: Odessa Rodriguez MD    No current facility-administered medications on file prior to encounter. Current Outpatient Medications on File Prior to Encounter   Medication Sig Dispense Refill    buPROPion XL (WELLBUTRIN XL) 300 mg XL tablet Take 300 mg by mouth daily. Indications: Anxiousness associated with Depression      albuterol (PROVENTIL HFA, VENTOLIN HFA, PROAIR HFA) 90 mcg/actuation inhaler Take 1 Puff by inhalation every six (6) hours as needed for Wheezing or Shortness of Breath (cough). 1 Inhaler 0    fexofenadine-pseudoephedrine (ALLEGRA-D 24 HOUR) 180-240 mg per tablet Take 1 Tab by mouth daily.  famotidine (PEPCID) 20 mg tablet Take 20 mg by mouth daily as needed.  hydrochlorothiazide (HYDRODIURIL) 25 mg tablet Take 1 Tab by mouth daily.  30 Tab 6    citalopram (CELEXA) 40 mg tablet TAKE ONE TABLET BY MOUTH EVERY DAY 90 Tab 2    simvastatin (ZOCOR) 40 mg tablet TAKE ONE TABLET BY MOUTH EVERY DAY AT BEDTIME 90 Tab 2    alprazolam (XANAX) 0.5 mg tablet Take 0.5 mg by mouth two (2) times a day.  CALCIUM CARBONATE/VITAMIN D3 (CALCIUM + D PO) Take 2 Tabs by mouth daily. Past History     Past Medical History:  Past Medical History:   Diagnosis Date    Adverse effect of anesthesia     hard to wake up    Allergic rhinitis 2013    Anxiety 2013    At risk for sleep apnea     3/20/19 ELISA score 4    Depression     GERD (gastroesophageal reflux disease)     Heart murmur     Hyperlipidemia 2013    Hypertension        Past Surgical History:  Past Surgical History:   Procedure Laterality Date    HX BLADDER SUSPENSION      HX BLEPHAROPLASTY Bilateral     upper    HX  SECTION      HX COLONOSCOPY      HX CRANIOTOMY  age 26    benign tumor cerebellum    HX GI      HX HEENT      oral surgery       Family History:  Family History   Problem Relation Age of Onset    Stroke Mother     Hypertension Mother     Asthma Maternal Aunt     Cancer Maternal Uncle     Diabetes Paternal Uncle     Heart Disease Maternal Grandfather        Social History:  Social History     Tobacco Use    Smoking status: Never Smoker    Smokeless tobacco: Never Used   Substance Use Topics    Alcohol use: Yes     Alcohol/week: 1.0 standard drink     Types: 1 Shots of liquor per week    Drug use: No       Allergies: Allergies   Allergen Reactions    Augmentin [Amoxicillin-Pot Clavulanate] Other (comments)     \"stopped working\"    Levaquin [Levofloxacin] Myalgia         Review of Systems   Review of Systems   Constitutional: Negative for fever. HENT: Positive for nosebleeds. Eyes: Negative. Respiratory: Negative for shortness of breath. Cardiovascular: Negative for chest pain. Gastrointestinal: Negative for abdominal pain. Endocrine: Negative for heat intolerance. Genitourinary: Negative.     Musculoskeletal: Negative for back pain. Skin: Negative for rash. Allergic/Immunologic: Negative for immunocompromised state. Neurological: Positive for headaches. Negative for dizziness. Hematological: Does not bruise/bleed easily. Psychiatric/Behavioral: Negative. All other systems reviewed and are negative. Physical Exam   Physical Exam  Vitals and nursing note reviewed. Constitutional:       General: She is not in acute distress. Appearance: She is well-developed. HENT:      Head: Normocephalic. Nose:      Comments: No evidence of bleeding  Cardiovascular:      Rate and Rhythm: Normal rate and regular rhythm. Pulses: Normal pulses. Heart sounds: Normal heart sounds. Pulmonary:      Effort: Pulmonary effort is normal.      Breath sounds: Normal breath sounds. Abdominal:      General: Bowel sounds are normal.      Palpations: Abdomen is soft. Tenderness: There is no abdominal tenderness. Musculoskeletal:         General: Normal range of motion. Cervical back: Normal range of motion and neck supple. Skin:     General: Skin is warm and dry. Neurological:      General: No focal deficit present. Mental Status: She is alert and oriented to person, place, and time. Psychiatric:         Mood and Affect: Mood normal.         Behavior: Behavior normal.         Diagnostic Study Results     Labs -   No results found for this or any previous visit (from the past 12 hour(s)). Radiologic Studies -   No orders to display     CT Results  (Last 48 hours)    None        CXR Results  (Last 48 hours)    None          Medical Decision Making   I am the first provider for this patient. I reviewed the vital signs, available nursing notes, past medical history, past surgical history, family history and social history. Vital Signs-Reviewed the patient's vital signs.   Patient Vitals for the past 12 hrs:   Temp Pulse Resp BP SpO2   01/23/22 1233 98.8 °F (37.1 °C) 100 20 (!) 160/88 99 %         Records Reviewed: Nursing Notes and Old Medical Records    Provider Notes (Medical Decision Making):   Epistaxis    ED Course:   Initial assessment performed. The patients presenting problems have been discussed, and they are in agreement with the care plan formulated and outlined with them. I have encouraged them to ask questions as they arise throughout their visit. Progress note:    Patient is ready to go home. She is given instructions and she is to follow-up with her ENT as needed               Critical Care Time:   0    Disposition:  home    DISCHARGE PLAN:  1. Discharge Medication List as of 1/23/2022  2:03 PM        2. Follow-up Information     Follow up With Specialties Details Why Contact Info    Buddy Walsh MD Pediatric Medicine, Internal Medicine  As needed [address]  [address]  [facility]  [phone]      Senait Haley MD Otolaryngology  As needed [address]   [address]  [address]  [facility] [phone]      [facility] EMERGENCY DEPT Emergency Medicine  If symptoms worsen [address]  [address] [facility]  [phone]        3. Return to ED if worse     Diagnosis     Clinical Impression:   1. Epistaxis        Attestations:    Heladio Tamayo MD    Please note that this dictation was completed with [employer], the computer voice recognition software. Quite often unanticipated grammatical, syntax, homophones, and other interpretive errors are inadvertently transcribed by the computer software. Please disregard these errors. Please excuse any errors that have escaped final proofreading. Thank you.

## 2022-06-21 ENCOUNTER — TRANSCRIBE ORDER (OUTPATIENT)
Dept: SCHEDULING | Age: 67
End: 2022-06-21

## 2022-06-21 DIAGNOSIS — J32.9 CHRONIC SINUSITIS: Primary | ICD-10-CM

## 2022-06-30 ENCOUNTER — HOSPITAL ENCOUNTER (OUTPATIENT)
Dept: CT IMAGING | Age: 67
Discharge: HOME | End: 2022-06-30
Attending: INTERNAL MEDICINE
Payer: COMMERCIAL

## 2022-06-30 DIAGNOSIS — J32.9 CHRONIC SINUSITIS: ICD-10-CM

## 2022-06-30 PROCEDURE — 70486 CT MAXILLOFACIAL W/O DYE: CPT

## 2023-10-05 ENCOUNTER — TRANSCRIBE ORDERS (OUTPATIENT)
Facility: HOSPITAL | Age: 68
End: 2023-10-05

## 2023-10-05 DIAGNOSIS — H47.10 PAPILLEDEMA: ICD-10-CM

## 2023-10-05 DIAGNOSIS — H25.13 AGE-RELATED NUCLEAR CATARACT, BILATERAL: Primary | ICD-10-CM

## 2023-10-19 ENCOUNTER — HOSPITAL ENCOUNTER (OUTPATIENT)
Age: 68
Discharge: HOME | End: 2023-10-22

## 2023-10-19 DIAGNOSIS — H47.10 PAPILLEDEMA: ICD-10-CM

## 2023-10-19 DIAGNOSIS — H25.13 AGE-RELATED NUCLEAR CATARACT, BILATERAL: ICD-10-CM

## 2023-10-27 ENCOUNTER — TRANSCRIBE ORDERS (OUTPATIENT)
Facility: HOSPITAL | Age: 68
End: 2023-10-27

## 2023-10-27 DIAGNOSIS — H47.10 PAPILLEDEMA: ICD-10-CM

## 2023-10-27 DIAGNOSIS — H25.9 AGE-RELATED CATARACT OF BOTH EYES, UNSPECIFIED AGE-RELATED CATARACT TYPE: Primary | ICD-10-CM

## 2023-10-31 ENCOUNTER — HOSPITAL ENCOUNTER (OUTPATIENT)
Facility: HOSPITAL | Age: 68
Discharge: HOME | End: 2023-11-03
Payer: MEDICARE

## 2023-10-31 DIAGNOSIS — H47.10 PAPILLEDEMA: ICD-10-CM

## 2023-10-31 DIAGNOSIS — H25.9 AGE-RELATED CATARACT OF BOTH EYES, UNSPECIFIED AGE-RELATED CATARACT TYPE: ICD-10-CM

## 2023-10-31 LAB — CREAT BLD-MCNC: 0.9 MG/DL (ref 0.6–1.3)

## 2023-10-31 PROCEDURE — 70470 CT HEAD/BRAIN W/O & W/DYE: CPT

## 2023-10-31 PROCEDURE — 82565 ASSAY OF CREATININE: CPT

## 2023-10-31 PROCEDURE — 70481 CT ORBIT/EAR/FOSSA W/DYE: CPT

## 2023-10-31 PROCEDURE — 6360000004 HC RX CONTRAST MEDICATION: Performed by: OPHTHALMOLOGY

## 2023-10-31 RX ADMIN — IOPAMIDOL 100 ML: 755 INJECTION, SOLUTION INTRAVENOUS at 17:40

## 2023-11-20 ENCOUNTER — TRANSCRIBE ORDERS (OUTPATIENT)
Facility: HOSPITAL | Age: 68
End: 2023-11-20

## 2023-11-20 DIAGNOSIS — H47.11 PAPILLEDEMA ASSOCIATED WITH INCREASED INTRACRANIAL PRESSURE: ICD-10-CM

## 2023-11-20 DIAGNOSIS — R90.0 INTRACRANIAL MASS: Primary | ICD-10-CM

## 2023-12-27 ENCOUNTER — HOSPITAL ENCOUNTER (OUTPATIENT)
Facility: HOSPITAL | Age: 68
Discharge: HOME | End: 2023-12-30
Payer: MEDICARE

## 2023-12-27 VITALS
WEIGHT: 130 LBS | TEMPERATURE: 98 F | BODY MASS INDEX: 21.66 KG/M2 | RESPIRATION RATE: 16 BRPM | OXYGEN SATURATION: 94 % | HEART RATE: 77 BPM | SYSTOLIC BLOOD PRESSURE: 146 MMHG | DIASTOLIC BLOOD PRESSURE: 75 MMHG | HEIGHT: 65 IN

## 2023-12-27 DIAGNOSIS — H47.9 UNSPECIFIED DISORDER OF VISUAL PATHWAYS: ICD-10-CM

## 2023-12-27 DIAGNOSIS — H47.11 PAPILLEDEMA ASSOCIATED WITH INCREASED INTRACRANIAL PRESSURE: ICD-10-CM

## 2023-12-27 DIAGNOSIS — D32.9 MENINGIOMA (HCC): ICD-10-CM

## 2023-12-27 DIAGNOSIS — R90.0 INTRACRANIAL SPACE-OCCUPYING LESION FOUND ON DIAGNOSTIC IMAGING OF CENTRAL NERVOUS SYSTEM: ICD-10-CM

## 2023-12-27 DIAGNOSIS — R90.0 INTRACRANIAL MASS: ICD-10-CM

## 2023-12-27 PROCEDURE — 6360000002 HC RX W HCPCS: Performed by: STUDENT IN AN ORGANIZED HEALTH CARE EDUCATION/TRAINING PROGRAM

## 2023-12-27 PROCEDURE — A9579 GAD-BASE MR CONTRAST NOS,1ML: HCPCS | Performed by: INTERNAL MEDICINE

## 2023-12-27 PROCEDURE — 6360000004 HC RX CONTRAST MEDICATION: Performed by: INTERNAL MEDICINE

## 2023-12-27 PROCEDURE — 2580000003 HC RX 258: Performed by: STUDENT IN AN ORGANIZED HEALTH CARE EDUCATION/TRAINING PROGRAM

## 2023-12-27 PROCEDURE — 70544 MR ANGIOGRAPHY HEAD W/O DYE: CPT

## 2023-12-27 PROCEDURE — 70553 MRI BRAIN STEM W/O & W/DYE: CPT

## 2023-12-27 RX ORDER — MIDAZOLAM HYDROCHLORIDE 1 MG/ML
5 INJECTION INTRAMUSCULAR; INTRAVENOUS ONCE
Status: COMPLETED | OUTPATIENT
Start: 2023-12-27 | End: 2023-12-27

## 2023-12-27 RX ORDER — FENTANYL CITRATE 50 UG/ML
100 INJECTION, SOLUTION INTRAMUSCULAR; INTRAVENOUS
Status: DISCONTINUED | OUTPATIENT
Start: 2023-12-27 | End: 2023-12-27

## 2023-12-27 RX ORDER — SODIUM CHLORIDE 9 MG/ML
INJECTION, SOLUTION INTRAVENOUS CONTINUOUS
Status: DISCONTINUED | OUTPATIENT
Start: 2023-12-27 | End: 2023-12-27

## 2023-12-27 RX ORDER — DIPHENHYDRAMINE HYDROCHLORIDE 50 MG/ML
25 INJECTION INTRAMUSCULAR; INTRAVENOUS ONCE
Status: COMPLETED | OUTPATIENT
Start: 2023-12-27 | End: 2023-12-27

## 2023-12-27 RX ADMIN — DIPHENHYDRAMINE HYDROCHLORIDE 50 MG: 50 INJECTION INTRAMUSCULAR; INTRAVENOUS at 12:54

## 2023-12-27 RX ADMIN — GADOTERIDOL 12 ML: 279.3 INJECTION, SOLUTION INTRAVENOUS at 13:34

## 2023-12-27 RX ADMIN — SODIUM CHLORIDE: 9 INJECTION, SOLUTION INTRAVENOUS at 12:44

## 2023-12-27 RX ADMIN — MIDAZOLAM 3 MG: 1 INJECTION INTRAMUSCULAR; INTRAVENOUS at 13:55

## 2023-12-27 RX ADMIN — FENTANYL CITRATE 100 MCG: 50 INJECTION INTRAMUSCULAR; INTRAVENOUS at 13:56

## 2023-12-27 NOTE — PROGRESS NOTES
Pt. Transferred back to [facility] recovery via stretcher accomp. By nurse and noted with [facility] elevated approx 45 degrees. Pt noted awake, alert. Pt without c/o pain at this time. Pt reconnected to monitor x 3 (cardiac, dinemapp, pulse ox).

## 2023-12-27 NOTE — DISCHARGE INSTRUCTIONS
[facility]  Radiology Department  [phone]    Radiologist:  Dr. Ilda Castellanos and/or Associate CATRACHO Alexander    Nurse:  Caryl Vanegas RN    Date:  12/27/2023        MRI With Sedation Discharge Instructions      Go home and rest and restrict your activity the next 24 hours. You have been given sedating medications, so do not drive or drink alcohol today. Resume your previous diet and medications. You may return to work and resume normal activities tomorrow. Results of your MRI will be sent to your physician as soon as they become available. MRI With Sedation Discharge Instructions      Go home and rest and restrict your activity the next 24 hours. You have been given sedating medications, so do not drive or drink alcohol today. Resume your previous diet and medications. You may return to work and resume normal activities tomorrow. Results of your MRI will be sent to your physician as soon as they become available.

## 2023-12-27 NOTE — H&P
proceed  Code status:  Full      Albert Philip, APRN - NP  [facility]

## 2023-12-27 NOTE — PROGRESS NOTES
Pt tolerated ginger ale without difficulty - pt without c/o pain at this time. Pt dressed self post IV being d/c'd left hand. I have reviewed discharge instructions with the patient. The patient verbalized understanding. Copy of discharge instructions per AVS copied, reviewed with pt., signature sheet signed and sent to [facility]. [facility] for scanning r/t penpad not working and copy to pt. Prior to discharge. Pt stable without c/o pain. Pt. Sat into w/c for discharge to home via private vehicle with her  driving her home. Pt accomp. by nurse to car for discharge. Pt will return to get her MRI CD disk r/t she stated she doesn't want to wait and she lives close.

## (undated) DEVICE — BLADE OPHTH MINI BEAV SHRP --

## (undated) DEVICE — SUTURE ABSORBABLE MONOFILAMENT 4-0 SC-1 18 IN PLN GUT 1824H

## (undated) DEVICE — TIP SUCT BLU PLAS BLB W/O CTRL VENT YANK

## (undated) DEVICE — DEVICE INFL PRSS G INDIC DISP (MUST BE PURC IN MULTIPLES OF 5)

## (undated) DEVICE — 3M™ TEGADERM™ TRANSPARENT FILM DRESSING FRAME STYLE, 1624W, 2-3/8 IN X 2-3/4 IN (6 CM X 7 CM), 100/CT 4CT/CASE: Brand: 3M™ TEGADERM™

## (undated) DEVICE — 1010 S-DRAPE TOWEL DRAPE 10/BX: Brand: STERI-DRAPE™

## (undated) DEVICE — STERILE POLYISOPRENE POWDER-FREE SURGICAL GLOVES: Brand: PROTEXIS

## (undated) DEVICE — SUTURE CHROMIC GUT SZ 4-0 L18IN ABSRB BRN L13MM P-3 3/8 CIR 1654G

## (undated) DEVICE — CONTINU-FLO SOLUTION SET, 2 INJECTION SITES, MALE LUER LOCK ADAPTER WITH RETRACTABLE COLLAR, LARGE BORE STOPCOCK WITH ROTATING MALE LUER LOCK EXTENSION SET, 2 INJECTION SITES, MALE LUER LOCK ADAPTER WITH RETRACTABLE COLLAR: Brand: INTERLINK/CONTINU-FLO

## (undated) DEVICE — NEEDLE HYPO 25GA L1.5IN BVL ORIENTED ECLIPSE

## (undated) DEVICE — TOWEL SURG W17XL27IN STD BLU COT NONFENESTRATED PREWASHED

## (undated) DEVICE — GAUZE SPONGES,8 PLY: Brand: CURITY

## (undated) DEVICE — CONTAINER,SPECIMEN,OR STERILE,4OZ: Brand: MEDLINE

## (undated) DEVICE — SYR 10ML LUER LOK 1/5ML GRAD --

## (undated) DEVICE — SOLUTION IV 500ML 0.9% SOD CHL FLX CONT

## (undated) DEVICE — BLADE 1884004 TRICUT 5PK 4MM: Brand: TRICUT®

## (undated) DEVICE — BLADE 1884080EM TRICUT 4MMX13CM M4 ROHS: Brand: FUSION®

## (undated) DEVICE — NDL SPNE QNCKE 25GX3.5IN LF --

## (undated) DEVICE — Device

## (undated) DEVICE — GAUZE SPONGES,12 PLY: Brand: CURITY

## (undated) DEVICE — AIRLIFE™ CORRUGATED FLEXIBLE POLYETHYLENE AND EVA TUBING FOR AEROSOL AND IPPB USE, SEGMENTED, 6 FEET (1.8 M) LENGTH, 22 MM I.D.: Brand: AIRLIFE™

## (undated) DEVICE — SOLUTION IV 1000ML 0.9% SOD CHL

## (undated) DEVICE — PATIENT TRACKER 9734887XOM NON-INVASIVE

## (undated) DEVICE — INSTRUMENT TRACKER 9733533XOM ENT 1PK

## (undated) DEVICE — Z DISCONTINUEDSOLUTION PREP 2OZ 10% POVIDONE IOD SCR CAP BTL

## (undated) DEVICE — 1200 GUARD II KIT W/5MM TUBE W/O VAC TUBE: Brand: GUARDIAN

## (undated) DEVICE — CATH IV AUTOGRD BC GRY 16GA 30 -- INSYTE

## (undated) DEVICE — NASAL EPISTAXIS 8 PACK: Brand: XEROGEL

## (undated) DEVICE — CODMAN® SURGICAL PATTIES 1/2" X 3" (1.27CM X 7.62CM): Brand: CODMAN®

## (undated) DEVICE — KIT ADAP STERILE WATER 1000ML -- AIRLIFE

## (undated) DEVICE — KENDALL DL ECG CABLE AND LEAD WIRE SYSTEM, 3-LEAD, SINGLE PATIENT USE: Brand: KENDALL

## (undated) DEVICE — SPLINT 1524055 DOYLE II AIRWAY SET: Brand: DOYLE II ™

## (undated) DEVICE — AIRLIFE™ FACE TENT MASK VINYL: Brand: AIRLIFE™

## (undated) DEVICE — SOLUTION LACTATED RINGERS INJECTION USP

## (undated) DEVICE — SYRINGE MED 20ML STD CLR PLAS LUERLOCK TIP N CTRL DISP

## (undated) DEVICE — SOL ANTI-FOG 6ML MEDC -- MEDICHOICE - CONVERT TO 358427

## (undated) DEVICE — BALLOON SINUPLASTY 6X16 MM SYS RELIEVA SPINPLUS

## (undated) DEVICE — NON-ADHERENT DRESSING: Brand: TELFA

## (undated) DEVICE — SUT ETHLN 3-0 18IN PS2 BLK --

## (undated) DEVICE — SYR 10ML CTRL LR LCK NSAF LF --

## (undated) DEVICE — PACK,EENT,TURBAN DRAPE,PK II: Brand: MEDLINE

## (undated) DEVICE — BLADE 1882040 5PK INFERIOR TURB 2MM

## (undated) DEVICE — INFECTION CONTROL KIT SYS

## (undated) DEVICE — BLADE 1884012EM RAD12 4MM M4 ROTATE ROHS: Brand: FUSION®

## (undated) DEVICE — SYR IRR BLB 2OZ DISP BLU STRL -- CONVERT TO ITEM 357637

## (undated) DEVICE — MEDI-VAC NON-CONDUCTIVE SUCTION TUBING: Brand: CARDINAL HEALTH